# Patient Record
Sex: FEMALE | Employment: FULL TIME | URBAN - METROPOLITAN AREA
[De-identification: names, ages, dates, MRNs, and addresses within clinical notes are randomized per-mention and may not be internally consistent; named-entity substitution may affect disease eponyms.]

---

## 2024-04-10 ENCOUNTER — MEDICAL CORRESPONDENCE (OUTPATIENT)
Dept: HEALTH INFORMATION MANAGEMENT | Facility: CLINIC | Age: 34
End: 2024-04-10
Payer: COMMERCIAL

## 2024-04-16 RX ORDER — DIAZEPAM 10 MG
10 TABLET ORAL EVERY 6 HOURS PRN
Status: ON HOLD | COMMUNITY
End: 2024-04-18

## 2024-04-16 RX ORDER — DESOGESTREL AND ETHINYL ESTRADIOL 0.15-0.03
1 KIT ORAL DAILY
COMMUNITY

## 2024-04-18 ENCOUNTER — HOSPITAL ENCOUNTER (OUTPATIENT)
Facility: CLINIC | Age: 34
Discharge: HOME | End: 2024-04-18
Attending: OBSTETRICS & GYNECOLOGY | Admitting: OBSTETRICS & GYNECOLOGY
Payer: COMMERCIAL

## 2024-04-18 ENCOUNTER — ANESTHESIA (OUTPATIENT)
Dept: SURGERY | Facility: CLINIC | Age: 34
End: 2024-04-18
Payer: COMMERCIAL

## 2024-04-18 ENCOUNTER — ANESTHESIA EVENT (OUTPATIENT)
Dept: SURGERY | Facility: CLINIC | Age: 34
End: 2024-04-18
Payer: COMMERCIAL

## 2024-04-18 VITALS
HEIGHT: 63 IN | HEART RATE: 71 BPM | OXYGEN SATURATION: 96 % | BODY MASS INDEX: 38.75 KG/M2 | WEIGHT: 218.7 LBS | RESPIRATION RATE: 16 BRPM | TEMPERATURE: 97.1 F | DIASTOLIC BLOOD PRESSURE: 74 MMHG | SYSTOLIC BLOOD PRESSURE: 128 MMHG

## 2024-04-18 DIAGNOSIS — R11.0 NAUSEA: ICD-10-CM

## 2024-04-18 DIAGNOSIS — G89.18 ACUTE POST-OPERATIVE PAIN: Primary | ICD-10-CM

## 2024-04-18 LAB
HCG INTACT+B SERPL-ACNC: <1 MIU/ML
HGB BLD-MCNC: 14.1 G/DL (ref 11.7–15.7)

## 2024-04-18 PROCEDURE — 58558 HYSTEROSCOPY BIOPSY: CPT | Performed by: ANESTHESIOLOGY

## 2024-04-18 PROCEDURE — 250N000009 HC RX 250: Performed by: ANESTHESIOLOGY

## 2024-04-18 PROCEDURE — 710N000009 HC RECOVERY PHASE 1, LEVEL 1, PER MIN: Performed by: OBSTETRICS & GYNECOLOGY

## 2024-04-18 PROCEDURE — 258N000001 HC RX 258: Performed by: OBSTETRICS & GYNECOLOGY

## 2024-04-18 PROCEDURE — 250N000009 HC RX 250: Performed by: OBSTETRICS & GYNECOLOGY

## 2024-04-18 PROCEDURE — 710N000012 HC RECOVERY PHASE 2, PER MINUTE: Performed by: OBSTETRICS & GYNECOLOGY

## 2024-04-18 PROCEDURE — 84702 CHORIONIC GONADOTROPIN TEST: CPT | Performed by: OBSTETRICS & GYNECOLOGY

## 2024-04-18 PROCEDURE — 360N000076 HC SURGERY LEVEL 3, PER MIN: Performed by: OBSTETRICS & GYNECOLOGY

## 2024-04-18 PROCEDURE — 85018 HEMOGLOBIN: CPT | Performed by: OBSTETRICS & GYNECOLOGY

## 2024-04-18 PROCEDURE — 272N000001 HC OR GENERAL SUPPLY STERILE: Performed by: OBSTETRICS & GYNECOLOGY

## 2024-04-18 PROCEDURE — 250N000013 HC RX MED GY IP 250 OP 250 PS 637: Performed by: OBSTETRICS & GYNECOLOGY

## 2024-04-18 PROCEDURE — 250N000011 HC RX IP 250 OP 636: Performed by: ANESTHESIOLOGY

## 2024-04-18 PROCEDURE — 370N000017 HC ANESTHESIA TECHNICAL FEE, PER MIN: Performed by: OBSTETRICS & GYNECOLOGY

## 2024-04-18 PROCEDURE — 88305 TISSUE EXAM BY PATHOLOGIST: CPT | Mod: 26 | Performed by: STUDENT IN AN ORGANIZED HEALTH CARE EDUCATION/TRAINING PROGRAM

## 2024-04-18 PROCEDURE — 58558 HYSTEROSCOPY BIOPSY: CPT

## 2024-04-18 PROCEDURE — 999N000141 HC STATISTIC PRE-PROCEDURE NURSING ASSESSMENT: Performed by: OBSTETRICS & GYNECOLOGY

## 2024-04-18 PROCEDURE — 88305 TISSUE EXAM BY PATHOLOGIST: CPT | Mod: TC | Performed by: OBSTETRICS & GYNECOLOGY

## 2024-04-18 PROCEDURE — 36415 COLL VENOUS BLD VENIPUNCTURE: CPT | Performed by: OBSTETRICS & GYNECOLOGY

## 2024-04-18 PROCEDURE — 258N000003 HC RX IP 258 OP 636: Performed by: ANESTHESIOLOGY

## 2024-04-18 RX ORDER — FENTANYL CITRATE 0.05 MG/ML
25 INJECTION, SOLUTION INTRAMUSCULAR; INTRAVENOUS EVERY 5 MIN PRN
Status: DISCONTINUED | OUTPATIENT
Start: 2024-04-18 | End: 2024-04-18 | Stop reason: HOSPADM

## 2024-04-18 RX ORDER — LIDOCAINE HYDROCHLORIDE AND EPINEPHRINE 10; 10 MG/ML; UG/ML
INJECTION, SOLUTION INFILTRATION; PERINEURAL PRN
Status: DISCONTINUED | OUTPATIENT
Start: 2024-04-18 | End: 2024-04-18 | Stop reason: HOSPADM

## 2024-04-18 RX ORDER — KETOROLAC TROMETHAMINE 30 MG/ML
INJECTION, SOLUTION INTRAMUSCULAR; INTRAVENOUS PRN
Status: DISCONTINUED | OUTPATIENT
Start: 2024-04-18 | End: 2024-04-18

## 2024-04-18 RX ORDER — IBUPROFEN 600 MG/1
600 TABLET, FILM COATED ORAL EVERY 6 HOURS PRN
Qty: 30 TABLET | Refills: 0 | Status: SHIPPED | OUTPATIENT
Start: 2024-04-18

## 2024-04-18 RX ORDER — PROPOFOL 10 MG/ML
INJECTION, EMULSION INTRAVENOUS PRN
Status: DISCONTINUED | OUTPATIENT
Start: 2024-04-18 | End: 2024-04-18

## 2024-04-18 RX ORDER — HYDROMORPHONE HCL IN WATER/PF 6 MG/30 ML
0.4 PATIENT CONTROLLED ANALGESIA SYRINGE INTRAVENOUS EVERY 5 MIN PRN
Status: DISCONTINUED | OUTPATIENT
Start: 2024-04-18 | End: 2024-04-18 | Stop reason: HOSPADM

## 2024-04-18 RX ORDER — ONDANSETRON 4 MG/1
4 TABLET, ORALLY DISINTEGRATING ORAL EVERY 30 MIN PRN
Status: DISCONTINUED | OUTPATIENT
Start: 2024-04-18 | End: 2024-04-18 | Stop reason: HOSPADM

## 2024-04-18 RX ORDER — HYDROMORPHONE HCL IN WATER/PF 6 MG/30 ML
0.2 PATIENT CONTROLLED ANALGESIA SYRINGE INTRAVENOUS EVERY 5 MIN PRN
Status: DISCONTINUED | OUTPATIENT
Start: 2024-04-18 | End: 2024-04-18 | Stop reason: HOSPADM

## 2024-04-18 RX ORDER — DEXAMETHASONE SODIUM PHOSPHATE 4 MG/ML
INJECTION, SOLUTION INTRA-ARTICULAR; INTRALESIONAL; INTRAMUSCULAR; INTRAVENOUS; SOFT TISSUE PRN
Status: DISCONTINUED | OUTPATIENT
Start: 2024-04-18 | End: 2024-04-18

## 2024-04-18 RX ORDER — ONDANSETRON 2 MG/ML
INJECTION INTRAMUSCULAR; INTRAVENOUS PRN
Status: DISCONTINUED | OUTPATIENT
Start: 2024-04-18 | End: 2024-04-18

## 2024-04-18 RX ORDER — LIDOCAINE HYDROCHLORIDE AND EPINEPHRINE 10; 10 MG/ML; UG/ML
INJECTION, SOLUTION INFILTRATION; PERINEURAL
Status: DISCONTINUED
Start: 2024-04-18 | End: 2024-04-18 | Stop reason: HOSPADM

## 2024-04-18 RX ORDER — SODIUM CHLORIDE, SODIUM LACTATE, POTASSIUM CHLORIDE, CALCIUM CHLORIDE 600; 310; 30; 20 MG/100ML; MG/100ML; MG/100ML; MG/100ML
INJECTION, SOLUTION INTRAVENOUS CONTINUOUS PRN
Status: DISCONTINUED | OUTPATIENT
Start: 2024-04-18 | End: 2024-04-18

## 2024-04-18 RX ORDER — NALOXONE HYDROCHLORIDE 0.4 MG/ML
0.1 INJECTION, SOLUTION INTRAMUSCULAR; INTRAVENOUS; SUBCUTANEOUS
Status: DISCONTINUED | OUTPATIENT
Start: 2024-04-18 | End: 2024-04-18 | Stop reason: HOSPADM

## 2024-04-18 RX ORDER — FENTANYL CITRATE 50 UG/ML
INJECTION, SOLUTION INTRAMUSCULAR; INTRAVENOUS PRN
Status: DISCONTINUED | OUTPATIENT
Start: 2024-04-18 | End: 2024-04-18

## 2024-04-18 RX ORDER — SODIUM CHLORIDE, SODIUM LACTATE, POTASSIUM CHLORIDE, CALCIUM CHLORIDE 600; 310; 30; 20 MG/100ML; MG/100ML; MG/100ML; MG/100ML
INJECTION, SOLUTION INTRAVENOUS CONTINUOUS
Status: DISCONTINUED | OUTPATIENT
Start: 2024-04-18 | End: 2024-04-18 | Stop reason: HOSPADM

## 2024-04-18 RX ORDER — ACETAMINOPHEN 325 MG/1
975 TABLET ORAL ONCE
Status: COMPLETED | OUTPATIENT
Start: 2024-04-18 | End: 2024-04-18

## 2024-04-18 RX ORDER — LIDOCAINE HYDROCHLORIDE 20 MG/ML
INJECTION, SOLUTION INFILTRATION; PERINEURAL PRN
Status: DISCONTINUED | OUTPATIENT
Start: 2024-04-18 | End: 2024-04-18

## 2024-04-18 RX ORDER — IBUPROFEN 600 MG/1
600 TABLET, FILM COATED ORAL
Status: DISCONTINUED | OUTPATIENT
Start: 2024-04-18 | End: 2024-04-18 | Stop reason: HOSPADM

## 2024-04-18 RX ORDER — PROPOFOL 10 MG/ML
INJECTION, EMULSION INTRAVENOUS CONTINUOUS PRN
Status: DISCONTINUED | OUTPATIENT
Start: 2024-04-18 | End: 2024-04-18

## 2024-04-18 RX ORDER — FENTANYL CITRATE 0.05 MG/ML
50 INJECTION, SOLUTION INTRAMUSCULAR; INTRAVENOUS EVERY 5 MIN PRN
Status: DISCONTINUED | OUTPATIENT
Start: 2024-04-18 | End: 2024-04-18 | Stop reason: HOSPADM

## 2024-04-18 RX ORDER — MAGNESIUM HYDROXIDE 1200 MG/15ML
LIQUID ORAL PRN
Status: DISCONTINUED | OUTPATIENT
Start: 2024-04-18 | End: 2024-04-18 | Stop reason: HOSPADM

## 2024-04-18 RX ORDER — ONDANSETRON 2 MG/ML
4 INJECTION INTRAMUSCULAR; INTRAVENOUS EVERY 30 MIN PRN
Status: DISCONTINUED | OUTPATIENT
Start: 2024-04-18 | End: 2024-04-18 | Stop reason: HOSPADM

## 2024-04-18 RX ORDER — ONDANSETRON 4 MG/1
4 TABLET, ORALLY DISINTEGRATING ORAL EVERY 8 HOURS PRN
Qty: 4 TABLET | Refills: 0 | Status: SHIPPED | OUTPATIENT
Start: 2024-04-18

## 2024-04-18 RX ORDER — HYDROCODONE BITARTRATE AND ACETAMINOPHEN 5; 325 MG/1; MG/1
1 TABLET ORAL
Status: DISCONTINUED | OUTPATIENT
Start: 2024-04-18 | End: 2024-04-18 | Stop reason: HOSPADM

## 2024-04-18 RX ADMIN — ACETAMINOPHEN 975 MG: 325 TABLET, FILM COATED ORAL at 09:22

## 2024-04-18 RX ADMIN — PROPOFOL 70 MG: 10 INJECTION, EMULSION INTRAVENOUS at 11:06

## 2024-04-18 RX ADMIN — PROPOFOL 200 MG: 10 INJECTION, EMULSION INTRAVENOUS at 11:04

## 2024-04-18 RX ADMIN — KETOROLAC TROMETHAMINE 30 MG: 30 INJECTION, SOLUTION INTRAMUSCULAR at 11:22

## 2024-04-18 RX ADMIN — DEXMEDETOMIDINE HYDROCHLORIDE 8 MCG: 100 INJECTION, SOLUTION INTRAVENOUS at 10:59

## 2024-04-18 RX ADMIN — SODIUM CHLORIDE, POTASSIUM CHLORIDE, SODIUM LACTATE AND CALCIUM CHLORIDE: 600; 310; 30; 20 INJECTION, SOLUTION INTRAVENOUS at 10:59

## 2024-04-18 RX ADMIN — DEXAMETHASONE SODIUM PHOSPHATE 4 MG: 4 INJECTION, SOLUTION INTRA-ARTICULAR; INTRALESIONAL; INTRAMUSCULAR; INTRAVENOUS; SOFT TISSUE at 11:10

## 2024-04-18 RX ADMIN — FENTANYL CITRATE 50 MCG: 50 INJECTION INTRAMUSCULAR; INTRAVENOUS at 11:18

## 2024-04-18 RX ADMIN — ONDANSETRON 4 MG: 2 INJECTION INTRAMUSCULAR; INTRAVENOUS at 11:10

## 2024-04-18 RX ADMIN — PROPOFOL 200 MCG/KG/MIN: 10 INJECTION, EMULSION INTRAVENOUS at 11:07

## 2024-04-18 RX ADMIN — LIDOCAINE HYDROCHLORIDE 100 MG: 20 INJECTION, SOLUTION INFILTRATION; PERINEURAL at 11:04

## 2024-04-18 RX ADMIN — FENTANYL CITRATE 50 MCG: 50 INJECTION INTRAMUSCULAR; INTRAVENOUS at 11:04

## 2024-04-18 RX ADMIN — MIDAZOLAM 2 MG: 1 INJECTION INTRAMUSCULAR; INTRAVENOUS at 10:59

## 2024-04-18 ASSESSMENT — ACTIVITIES OF DAILY LIVING (ADL)
ADLS_ACUITY_SCORE: 35
ADLS_ACUITY_SCORE: 33

## 2024-04-18 ASSESSMENT — ENCOUNTER SYMPTOMS: SEIZURES: 0

## 2024-04-18 ASSESSMENT — LIFESTYLE VARIABLES: TOBACCO_USE: 0

## 2024-04-18 NOTE — ANESTHESIA POSTPROCEDURE EVALUATION
Patient: Sarah Parada    Procedure: Procedure(s):  HYSTEROSCOPY DILATION AND CURETTAGE WITH MYOSURE       Anesthesia Type:  General    Note:  Disposition: Outpatient   Postop Pain Control: Uneventful            Sign Out: Well controlled pain   PONV: No   Neuro/Psych: Uneventful            Sign Out: Acceptable/Baseline neuro status   Airway/Respiratory: Uneventful            Sign Out: Acceptable/Baseline resp. status   CV/Hemodynamics: Uneventful            Sign Out: Acceptable CV status   Other NRE: NONE   DID A NON-ROUTINE EVENT OCCUR? No           Last vitals:  Vitals Value Taken Time   /75 04/18/24 1230   Temp     Pulse 79 04/18/24 1232   Resp 14 04/18/24 1232   SpO2 98 % 04/18/24 1234   Vitals shown include unfiled device data.    Electronically Signed By: eLle Wilcox MD  April 18, 2024  6:57 PM

## 2024-04-18 NOTE — ANESTHESIA PROCEDURE NOTES
Airway       Patient location during procedure: OR  Staff -        Anesthesiologist:  Lele Wilcox MD       CRNA: Erica Young APRN CRNA       Other Anesthesia Staff: Kathy Rajan       Performed By: ASHLEY and with CRNAs       Procedure performed by resident/fellow/CRNA in presence of a teaching physician.    Consent for Airway        Urgency: elective  Indications and Patient Condition       Indications for airway management: nickie-procedural       Induction type:intravenous       Mask difficulty assessment: 0 - not attempted    Final Airway Details       Final airway type: supraglottic airway    Supraglottic Airway Details        Type: LMA       Brand: Ambu AuraGain       LMA size: 4    Post intubation assessment        Placement verified by: capnometry, equal breath sounds and chest rise        Number of attempts at approach: 1       Secured with: tape       Ease of procedure: easy       Dentition: Intact and Unchanged

## 2024-04-18 NOTE — ANESTHESIA PREPROCEDURE EVALUATION
"Anesthesia Pre-Procedure Evaluation    Patient: Sarah Parada   MRN: [id number] : 1990        Procedure : Procedure(s):  HYSTEROSCOPY DILATION AND CURETTAGE WITH MYOSURE          Past Medical History:   Diagnosis Date    Uterine polyp       Past Surgical History:   Procedure Laterality Date    APPENDECTOMY  2020    LAPAROSCOPY  2021    LAPAROSCOPY  2020      No Known Allergies   Social History     Tobacco Use    Smoking status: Never    Smokeless tobacco: Not on file   Substance Use Topics    Alcohol use: Yes     Comment: occ      Wt Readings from Last 1 Encounters:   No data found for Wt        Anesthesia Evaluation            ROS/MED HX  ENT/Pulmonary:    (-) tobacco use, asthma and sleep apnea   Neurologic:    (-) no seizures and no CVA   Cardiovascular:    (-) hypertension   METS/Exercise Tolerance: >4 METS    Hematologic:       Musculoskeletal:       GI/Hepatic:    (-) GERD and liver disease   Renal/Genitourinary:    (-) renal disease   Endo:     (+)               Obesity,    (-) Type II DM and thyroid disease   Psychiatric/Substance Use:     (+) psychiatric history anxiety       Infectious Disease:       Malignancy:       Other:            Physical Exam    Airway        Mallampati: II   TM distance: > 3 FB   Neck ROM: full   Mouth opening: > 3 cm    Respiratory Devices and Support         Dental  no notable dental history     (+) Completely normal teeth      Cardiovascular          Rhythm and rate: regular and normal     Pulmonary   pulmonary exam normal                OUTSIDE LABS:  CBC: No results found for: \"WBC\", \"HGB\", \"HCT\", \"PLT\"  BMP: No results found for: \"NA\", \"POTASSIUM\", \"CHLORIDE\", \"CO2\", \"BUN\", \"CR\", \"GLC\"  COAGS: No results found for: \"PTT\", \"INR\", \"FIBR\"  POC: No results found for: \"BGM\", \"HCG\", \"HCGS\"  HEPATIC: No results found for: \"ALBUMIN\", \"PROTTOTAL\", \"ALT\", \"AST\", \"GGT\", \"ALKPHOS\", \"BILITOTAL\", \"BILIDIRECT\", \"LOIS\"  OTHER: No results found for: \"PH\", \"LACT\", " "\"A1C\", \"VISHNU\", \"PHOS\", \"MAG\", \"LIPASE\", \"AMYLASE\", \"TSH\", \"T4\", \"T3\", \"CRP\", \"SED\"    Anesthesia Plan    ASA Status:  2    NPO Status:  NPO Appropriate    Anesthesia Type: General.     - Airway: LMA   Induction: Intravenous, Propofol.   Maintenance: Balanced.        Consents    Anesthesia Plan(s) and associated risks, benefits, and realistic alternatives discussed. Questions answered and patient/representative(s) expressed understanding.     - Discussed:     - Discussed with:  Patient            Postoperative Care    Pain management: IV analgesics.   PONV prophylaxis: Ondansetron (or other 5HT-3), Dexamethasone or Solumedrol, Background Propofol Infusion     Comments:               Lele Wilcox MD    I have reviewed the pertinent notes and labs in the chart from the past 30 days and (re)examined the patient.  Any updates or changes from those notes are reflected in this note.                  "

## 2024-04-18 NOTE — DISCHARGE INSTRUCTIONS
Today you were given 975 mg of Tylenol at 09:22am. The recommended daily maximum dose is 4000 mg.     Today you received Toradol, an antiinflammatory medication similar to Ibuprofen.  You should not take other antiinflammatory medication, such as Ibuprofen, Motrin, Advil, Aleve, Naprosyn, etc until 5:20 pm.    Same Day Surgery Discharge Instructions for  Sedation and General Anesthesia     It's not unusual to feel dizzy, light-headed or faint for up to 24 hours after surgery or while taking pain medication.  If you have these symptoms: sit for a few minutes before standing and have someone assist you when you get up to walk or use the bathroom.    You should rest and relax for the next 24 hours. We recommend you make arrangements to have an adult stay with you for at least 24 hours after your discharge.  Avoid hazardous and strenuous activity.    DO NOT DRIVE any vehicle or operate mechanical equipment for 24 hours following the end of your surgery.  Even though you may feel normal, your reactions may be affected by the medication you have received.    Do not drink alcoholic beverages for 24 hours following surgery.     Slowly progress to your regular diet as you feel able. It's not unusual to feel nauseated and/or vomit after receiving anesthesia.  If you develop these symptoms, drink clear liquids (apple juice, ginger ale, broth, 7-up, etc. ) until you feel better.  If your nausea and vomiting persists for 24 hours, please notify your surgeon.      All narcotic pain medications, along with inactivity and anesthesia, can cause constipation. Drinking plenty of liquids and increasing fiber intake will help.    For any questions of a medical nature, call your surgeon.    Do not make important decisions for 24 hours.    If you had general anesthesia, you may have a sore throat for a couple of days related to the breathing tube used during surgery.  You may use Cepacol lozenges to help with this discomfort.  If it  worsens or if you develop a fever, contact your surgeon.     If you feel your pain is not well managed with the pain medications prescribed by your surgeon, please contact your surgeon's office to let them know so they can address your concerns.      **If you have questions or concerns about your procedure,   call Dr. Dumont at **

## 2024-04-18 NOTE — ANESTHESIA CARE TRANSFER NOTE
Patient: Sarah Parada    Procedure: Procedure(s):  HYSTEROSCOPY DILATION AND CURETTAGE WITH MYOSURE       Diagnosis: Endometrial polyp [N84.0]  Diagnosis Additional Information: No value filed.    Anesthesia Type:   General     Note:    Oropharynx: oropharynx clear of all foreign objects and spontaneously breathing  Level of Consciousness: awake and drowsy  Oxygen Supplementation: face mask  Level of Supplemental Oxygen (L/min / FiO2): 6  Independent Airway: airway patency satisfactory and stable  Dentition: dentition unchanged  Vital Signs Stable: post-procedure vital signs reviewed and stable  Report to RN Given: handoff report given  Patient transferred to: PACU    Handoff Report: Identifed the Patient, Identified the Reponsible Provider, Reviewed the pertinent medical history, Discussed the surgical course, Reviewed Intra-OP anesthesia mangement and issues during anesthesia, Set expectations for post-procedure period and Allowed opportunity for questions and acknowledgement of understanding      Vitals:  Vitals Value Taken Time   BP     Temp     Pulse 108 04/18/24 1133   Resp 16 04/18/24 1133   SpO2 99 % 04/18/24 1133   Vitals shown include unfiled device data.    Electronically Signed By: MARCELL Fish CRNA  April 18, 2024  11:34 AM

## 2024-04-18 NOTE — OP NOTE
Sarah Pattenthomas  1990  4/18/2024      Preop Dx: 1. Endometrial polyp    Postop Dx:  Same  2. Secretory appearing endometrium                          Procedure:  Dilatation, Curettage, Hysteroscopy, Myosure    Anesthesia:  MAC, LMA, paracervical block    Surgeon:  Nieves Son MD    Procedure:  The patient was brought to the operating room where following general anesthesia was placed in the dorsolithotomy position where she was prepped and draped.      A speculum was placed.  Paracervical block placed with 1% lidocaine with epinephrine. The cervix was grasped with a tenaculum.  The cervix was dilated to accommodate the hysteroscope.  Under direct visualization the hysteroscope was placed into the endometrial cavity with the findings of endometrial polyp.  Myosure introduced and polyp removed.  There was additional tissue that may have been thickened lining vs polyp that was also removed with the myosure.      The specimens were submitted to pathology for microscopic analysis.    The instruments were removed from the vagina and all areas were hemostatic.  The estimated blood loss was 5 cc.  All sponge, needle, and instrument counts were correct.  The patient was awakened and removed to the recovery room in stable condition.    Nieves Son MD  April 18, 2024

## 2024-04-19 LAB
PATH REPORT.COMMENTS IMP SPEC: NORMAL
PATH REPORT.COMMENTS IMP SPEC: NORMAL
PATH REPORT.FINAL DX SPEC: NORMAL
PATH REPORT.GROSS SPEC: NORMAL
PATH REPORT.MICROSCOPIC SPEC OTHER STN: NORMAL
PATH REPORT.RELEVANT HX SPEC: NORMAL
PHOTO IMAGE: NORMAL

## 2024-05-19 ENCOUNTER — HEALTH MAINTENANCE LETTER (OUTPATIENT)
Age: 34
End: 2024-05-19

## 2024-09-26 LAB
ABO (EXTERNAL): NORMAL
HEPATITIS B SURFACE ANTIGEN (EXTERNAL): NONREACTIVE
HIV1+2 AB SERPL QL IA: NONREACTIVE
PLATELET COUNT (EXTERNAL): 319 10E3/UL (ref 140–400)
RH (EXTERNAL): POSITIVE
RUBELLA ANTIBODY IGG (EXTERNAL): NORMAL
TREPONEMA PALLIDUM ANTIBODY (EXTERNAL): NONREACTIVE

## 2024-10-10 ENCOUNTER — TRANSFERRED RECORDS (OUTPATIENT)
Dept: MULTI SPECIALTY CLINIC | Facility: CLINIC | Age: 34
End: 2024-10-10

## 2024-10-10 LAB — PAP SMEAR - HIM PATIENT REPORTED: NORMAL

## 2024-10-23 ENCOUNTER — MEDICAL CORRESPONDENCE (OUTPATIENT)
Dept: HEALTH INFORMATION MANAGEMENT | Facility: CLINIC | Age: 34
End: 2024-10-23
Payer: COMMERCIAL

## 2024-10-24 ENCOUNTER — TRANSCRIBE ORDERS (OUTPATIENT)
Dept: MATERNAL FETAL MEDICINE | Facility: CLINIC | Age: 34
End: 2024-10-24
Payer: COMMERCIAL

## 2024-10-24 DIAGNOSIS — O26.90 PREGNANCY RELATED CONDITION, ANTEPARTUM: Primary | ICD-10-CM

## 2024-11-26 ENCOUNTER — PRE VISIT (OUTPATIENT)
Dept: MATERNAL FETAL MEDICINE | Facility: CLINIC | Age: 34
End: 2024-11-26
Payer: COMMERCIAL

## 2024-11-29 ENCOUNTER — HOSPITAL ENCOUNTER (OUTPATIENT)
Dept: ULTRASOUND IMAGING | Facility: CLINIC | Age: 34
Discharge: HOME | End: 2024-11-29
Attending: OBSTETRICS & GYNECOLOGY
Payer: COMMERCIAL

## 2024-11-29 DIAGNOSIS — O26.90 PREGNANCY RELATED CONDITION, ANTEPARTUM: ICD-10-CM

## 2024-11-29 PROCEDURE — 76811 OB US DETAILED SNGL FETUS: CPT

## 2024-11-29 PROCEDURE — 76811 OB US DETAILED SNGL FETUS: CPT | Mod: 26 | Performed by: OBSTETRICS & GYNECOLOGY

## 2024-12-03 ENCOUNTER — HOSPITAL ENCOUNTER (EMERGENCY)
Facility: CLINIC | Age: 34
Discharge: HOME | End: 2024-12-03
Attending: EMERGENCY MEDICINE
Payer: COMMERCIAL

## 2024-12-03 ENCOUNTER — APPOINTMENT (OUTPATIENT)
Dept: ULTRASOUND IMAGING | Facility: CLINIC | Age: 34
End: 2024-12-03
Attending: EMERGENCY MEDICINE
Payer: COMMERCIAL

## 2024-12-03 VITALS
RESPIRATION RATE: 24 BRPM | SYSTOLIC BLOOD PRESSURE: 142 MMHG | OXYGEN SATURATION: 100 % | DIASTOLIC BLOOD PRESSURE: 74 MMHG | HEART RATE: 92 BPM | TEMPERATURE: 98.2 F

## 2024-12-03 DIAGNOSIS — O16.2 HYPERTENSION DURING PREGNANCY IN SECOND TRIMESTER, UNSPECIFIED HYPERTENSION IN PREGNANCY TYPE: ICD-10-CM

## 2024-12-03 LAB
ALBUMIN MFR UR ELPH: <6 MG/DL
ALBUMIN SERPL BCG-MCNC: 3.6 G/DL (ref 3.5–5.2)
ALBUMIN UR-MCNC: NEGATIVE MG/DL
ALP SERPL-CCNC: 68 U/L (ref 40–150)
ALT SERPL W P-5'-P-CCNC: 34 U/L (ref 0–50)
ANION GAP SERPL CALCULATED.3IONS-SCNC: 10 MMOL/L (ref 7–15)
APPEARANCE UR: CLEAR
AST SERPL W P-5'-P-CCNC: 29 U/L (ref 0–45)
ATRIAL RATE - MUSE: 93 BPM
BASOPHILS # BLD AUTO: 0 10E3/UL (ref 0–0.2)
BASOPHILS NFR BLD AUTO: 0 %
BILIRUB SERPL-MCNC: 0.2 MG/DL
BILIRUB UR QL STRIP: NEGATIVE
BUN SERPL-MCNC: 8.4 MG/DL (ref 6–20)
CALCIUM SERPL-MCNC: 9.7 MG/DL (ref 8.8–10.4)
CHLORIDE SERPL-SCNC: 106 MMOL/L (ref 98–107)
COLOR UR AUTO: COLORLESS
CREAT SERPL-MCNC: 0.58 MG/DL (ref 0.51–0.95)
CREAT UR-MCNC: 33.5 MG/DL
DIASTOLIC BLOOD PRESSURE - MUSE: 84 MMHG
EGFRCR SERPLBLD CKD-EPI 2021: >90 ML/MIN/1.73M2
EOSINOPHIL # BLD AUTO: 0.2 10E3/UL (ref 0–0.7)
EOSINOPHIL NFR BLD AUTO: 2 %
ERYTHROCYTE [DISTWIDTH] IN BLOOD BY AUTOMATED COUNT: 13.9 % (ref 10–15)
GLUCOSE SERPL-MCNC: 94 MG/DL (ref 70–99)
GLUCOSE UR STRIP-MCNC: NEGATIVE MG/DL
HCO3 SERPL-SCNC: 22 MMOL/L (ref 22–29)
HCT VFR BLD AUTO: 39.1 % (ref 35–47)
HGB BLD-MCNC: 13.2 G/DL (ref 11.7–15.7)
HGB UR QL STRIP: NEGATIVE
IMM GRANULOCYTES # BLD: 0.1 10E3/UL
IMM GRANULOCYTES NFR BLD: 1 %
INR PPP: 0.96 (ref 0.85–1.15)
INTERPRETATION ECG - MUSE: NORMAL
KETONES UR STRIP-MCNC: NEGATIVE MG/DL
LEUKOCYTE ESTERASE UR QL STRIP: NEGATIVE
LYMPHOCYTES # BLD AUTO: 2.6 10E3/UL (ref 0.8–5.3)
LYMPHOCYTES NFR BLD AUTO: 24 %
MAGNESIUM SERPL-MCNC: 1.7 MG/DL (ref 1.7–2.3)
MCH RBC QN AUTO: 29.5 PG (ref 26.5–33)
MCHC RBC AUTO-ENTMCNC: 33.8 G/DL (ref 31.5–36.5)
MCV RBC AUTO: 88 FL (ref 78–100)
MONOCYTES # BLD AUTO: 0.8 10E3/UL (ref 0–1.3)
MONOCYTES NFR BLD AUTO: 8 %
NEUTROPHILS # BLD AUTO: 7.3 10E3/UL (ref 1.6–8.3)
NEUTROPHILS NFR BLD AUTO: 66 %
NITRATE UR QL: NEGATIVE
NRBC # BLD AUTO: 0 10E3/UL
NRBC BLD AUTO-RTO: 0 /100
NT-PROBNP SERPL-MCNC: 76 PG/ML (ref 0–450)
P AXIS - MUSE: 67 DEGREES
PH UR STRIP: 6 [PH] (ref 5–7)
PLATELET # BLD AUTO: 236 10E3/UL (ref 150–450)
POTASSIUM SERPL-SCNC: 3.9 MMOL/L (ref 3.4–5.3)
PR INTERVAL - MUSE: 164 MS
PROT SERPL-MCNC: 6.4 G/DL (ref 6.4–8.3)
PROT/CREAT 24H UR: NORMAL MG/G{CREAT}
QRS DURATION - MUSE: 80 MS
QT - MUSE: 350 MS
QTC - MUSE: 435 MS
R AXIS - MUSE: 48 DEGREES
RBC # BLD AUTO: 4.47 10E6/UL (ref 3.8–5.2)
RBC URINE: 0 /HPF
SODIUM SERPL-SCNC: 138 MMOL/L (ref 135–145)
SP GR UR STRIP: 1.01 (ref 1–1.03)
SQUAMOUS EPITHELIAL: 5 /HPF
SYSTOLIC BLOOD PRESSURE - MUSE: 159 MMHG
T AXIS - MUSE: 21 DEGREES
TROPONIN T SERPL HS-MCNC: <6 NG/L
UROBILINOGEN UR STRIP-MCNC: <2 MG/DL
VENTRICULAR RATE- MUSE: 93 BPM
WBC # BLD AUTO: 11 10E3/UL (ref 4–11)
WBC URINE: 1 /HPF

## 2024-12-03 PROCEDURE — 84484 ASSAY OF TROPONIN QUANT: CPT | Performed by: EMERGENCY MEDICINE

## 2024-12-03 PROCEDURE — 83735 ASSAY OF MAGNESIUM: CPT | Performed by: EMERGENCY MEDICINE

## 2024-12-03 PROCEDURE — 36415 COLL VENOUS BLD VENIPUNCTURE: CPT | Performed by: EMERGENCY MEDICINE

## 2024-12-03 PROCEDURE — 83880 ASSAY OF NATRIURETIC PEPTIDE: CPT | Performed by: EMERGENCY MEDICINE

## 2024-12-03 PROCEDURE — 81001 URINALYSIS AUTO W/SCOPE: CPT | Performed by: EMERGENCY MEDICINE

## 2024-12-03 PROCEDURE — 99285 EMERGENCY DEPT VISIT HI MDM: CPT | Mod: 25 | Performed by: EMERGENCY MEDICINE

## 2024-12-03 PROCEDURE — 93005 ELECTROCARDIOGRAM TRACING: CPT | Performed by: EMERGENCY MEDICINE

## 2024-12-03 PROCEDURE — 85004 AUTOMATED DIFF WBC COUNT: CPT | Performed by: EMERGENCY MEDICINE

## 2024-12-03 PROCEDURE — 85610 PROTHROMBIN TIME: CPT | Performed by: EMERGENCY MEDICINE

## 2024-12-03 PROCEDURE — 99285 EMERGENCY DEPT VISIT HI MDM: CPT

## 2024-12-03 PROCEDURE — 82040 ASSAY OF SERUM ALBUMIN: CPT | Performed by: EMERGENCY MEDICINE

## 2024-12-03 PROCEDURE — 76815 OB US LIMITED FETUS(S): CPT

## 2024-12-03 PROCEDURE — 84156 ASSAY OF PROTEIN URINE: CPT | Performed by: EMERGENCY MEDICINE

## 2024-12-03 PROCEDURE — 250N000013 HC RX MED GY IP 250 OP 250 PS 637: Performed by: EMERGENCY MEDICINE

## 2024-12-03 PROCEDURE — 80053 COMPREHEN METABOLIC PANEL: CPT | Performed by: EMERGENCY MEDICINE

## 2024-12-03 PROCEDURE — 82247 BILIRUBIN TOTAL: CPT | Performed by: EMERGENCY MEDICINE

## 2024-12-03 RX ORDER — LABETALOL 100 MG/1
200 TABLET, FILM COATED ORAL ONCE
Status: COMPLETED | OUTPATIENT
Start: 2024-12-03 | End: 2024-12-03

## 2024-12-03 RX ADMIN — LABETALOL HYDROCHLORIDE 200 MG: 100 TABLET, FILM COATED ORAL at 19:28

## 2024-12-03 RX ADMIN — LABETALOL HYDROCHLORIDE 200 MG: 100 TABLET, FILM COATED ORAL at 18:13

## 2024-12-03 ASSESSMENT — COLUMBIA-SUICIDE SEVERITY RATING SCALE - C-SSRS
1. IN THE PAST MONTH, HAVE YOU WISHED YOU WERE DEAD OR WISHED YOU COULD GO TO SLEEP AND NOT WAKE UP?: NO
6. HAVE YOU EVER DONE ANYTHING, STARTED TO DO ANYTHING, OR PREPARED TO DO ANYTHING TO END YOUR LIFE?: NO
2. HAVE YOU ACTUALLY HAD ANY THOUGHTS OF KILLING YOURSELF IN THE PAST MONTH?: NO

## 2024-12-03 ASSESSMENT — ACTIVITIES OF DAILY LIVING (ADL)
ADLS_ACUITY_SCORE: 41

## 2024-12-03 NOTE — ED TRIAGE NOTES
Pt 19wk pregnant c/o swelling to hands and feet for past week. Worsening today. Pt also with elevated BP today. Took 200mg labetalol this morning at 6:15am. No flashing lights or visual disturbances. Mild HA today and yesterday. Endorses abdominal discomfort today. [facility] recommending going to ER. No vaginal discharge. Pt alert.     Triage Assessment (Adult)       Row Name 12/03/24 6079          Triage Assessment    Airway WDL WDL        Respiratory WDL    Respiratory WDL WDL        Skin Circulation/Temperature WDL    Skin Circulation/Temperature WDL WDL        Cardiac WDL    Cardiac WDL WDL        Peripheral/Neurovascular WDL    Peripheral Neurovascular WDL X  hand and feet swelling worsening today        Cognitive/Neuro/Behavioral WDL    Cognitive/Neuro/Behavioral WDL WDL

## 2024-12-03 NOTE — ED NOTES
Agree with triage note, pt reports dull headache, denies vision changes. Reports has had some chest pressure at times, but increase swelling to legs and feet for past week.

## 2024-12-04 NOTE — DISCHARGE INSTRUCTIONS
Follow-up with your OB appointment tomorrow as scheduled.  Take 400 mg of labetalol twice a day instead of your prior dose.    Return to the ER if you develop chest pain, shortness of breath, significantly worsening swelling, vaginal bleeding, severe headache, numbness, tingling, weakness or any new or worsening concerns  
inability to prepare and administer dose correctly/pt only swallows liquids pills were dispenced

## 2024-12-05 NOTE — ED PROVIDER NOTES
EMERGENCY DEPARTMENT ENCOUNTER      NAME: Sarah Parada  AGE: 34 year old female  YOB: 1990  MRN: [id number]  EVALUATION DATE & TIME: 12/3/2024  5:21 PM    PCP: No Ref-Primary, Physician    ED PROVIDER: Tameka John MD      Chief Complaint   Patient presents with    Hypertension    Edema         FINAL IMPRESSION:  1. Hypertension during pregnancy in second trimester, unspecified hypertension in pregnancy type          ED COURSE & MEDICAL DECISION MAKING:    Pertinent Labs & Imaging studies reviewed. (See chart for details)    5:33 PM I introduced myself to the patient, obtained patient history, performed a physical exam, and discussed plan for ED workup including potential diagnostic laboratory/imaging studies and interventions.    34 year old female presents to the Emergency Department for evaluation of ***    ED Course as of 12/05/24 1733 Tue Dec 03, 2024   1808 Denied any chest pain recently when I evaluated the patient   1918 Dr. Son recommended another 200 mg labetalol here as well as having the patient double up her dose at home so taking 400 mg twice daily.  She advised that she follow-up in clinic tomorrow and call at 8 AM to be seen by them tomorrow for recheck.       At the conclusion of the encounter I discussed the results of all of the tests and the disposition. The questions were answered. The patient or family acknowledged understanding and was agreeable with the care plan.       Medical Decision Making  Obtained supplemental history:Supplemental history obtained?: Documented in chart  Reviewed external records: External records reviewed?: Documented in chart  Care impacted by chronic illness:Documented in Chart  Care significantly affected by social determinants of health:N/A  Did you consider but not order tests?: Work up considered but not performed and documented in chart, if applicable  Did you interpret images independently?: Independent interpretation of ECG and  images noted in documentation, when applicable.  Consultation discussion with other provider:Did you involve another provider (consultant, , pharmacy, etc.)?: I discussed the care with another health care provider, see documentation for details.  Discharge. I prescribed additional prescription strength medication(s) as charted. See documentation for any additional details.    Not Applicable      MEDICATIONS GIVEN IN THE EMERGENCY:  Medications   labetalol (NORMODYNE) tablet 200 mg (200 mg Oral $Given 12/3/24 1813)   labetalol (NORMODYNE) tablet 200 mg (200 mg Oral $Given 12/3/24 1928)       NEW PRESCRIPTIONS STARTED AT TODAY'S ER VISIT  Discharge Medication List as of 12/3/2024  8:29 PM             =================================================================    HPI    Patient information was obtained from: Patient,     Sarah Parada is a 34 year old female who is 19w6d pregnant with chronic hypertension on labetalol, IVF who presents to this ED for evaluation of hypertension and swelling.       REVIEW OF SYSTEMS   Pertinent positives and negatives are documented in the HPI. All other systems reviewed and are negative.      PAST MEDICAL HISTORY:  Past Medical History:   Diagnosis Date    Uterine polyp        PAST SURGICAL HISTORY:  Past Surgical History:   Procedure Laterality Date    APPENDECTOMY  08/03/2020    DILATION AND CURETTAGE, OPERATIVE HYSTEROSCOPY WITH MORCELLATOR, COMBINED N/A 4/18/2024    Procedure: HYSTEROSCOPY DILATION AND CURETTAGE WITH MYOSURE;  Surgeon: Nieves Smith MD;  Location:  OR    LAPAROSCOPY  06/07/2021    LAPAROSCOPY  09/01/2020           CURRENT MEDICATIONS:    desogestrel-ethinyl estradiol (APRI) 0.15-30 MG-MCG tablet  ibuprofen (ADVIL/MOTRIN) 600 MG tablet  ondansetron (ZOFRAN ODT) 4 MG ODT tab        ALLERGIES:  No Known Allergies    FAMILY HISTORY:  No family history on file.    SOCIAL HISTORY:   Social History     Socioeconomic History    Marital  status:    Tobacco Use    Smoking status: Never   Substance and Sexual Activity    Alcohol use: Yes     Comment: occ    Drug use: Never       VITALS:  BP (!) 142/74   Pulse 92   Temp 98.2  F (36.8  C) (Temporal)   Resp 24   SpO2 100%     PHYSICAL EXAM    Physical Exam  Constitutional: Well developed, Well nourished, NAD, GCS 15  HENT: Normocephalic, Atraumatic, Bilateral external ears normal, Oropharynx normal, mucous membranes moist, Nose normal. Neck-  Normal range of motion, No tenderness, Supple, No stridor.    Eyes: PERRL, EOMI, Conjunctiva normal, No discharge.   Respiratory: Normal breath sounds, No respiratory distress, No wheezing or crackles, Speaks in full sentences easily.    Cardiovascular: Normal heart rate, Regular rhythm, No murmurs, No rubs, No gallops. 2+ radial pulses bilaterally  GI: Bowel sounds normal, Soft, No tenderness, No masses, No rebound or guarding.   Musculoskeletal: 2+ DP pulses. No notable lower extremity edema. *** No cyanosis, No clubbing. Good range of motion in all major joints. No tenderness to palpation or major deformities noted.  Integument: Warm, Dry, No erythema, No rash. No petechiae.   Neurologic: Alert & oriented x 3, 5/5 strength in all 4 extremities bilaterally. Sensation intact to light touch in all 4 extremities and the face bilaterally. No focal deficits noted. Normal gait. CN 2-12 intact. No visual field deficit.     Psychiatric: Affect normal, Judgment normal, Mood normal. Cooperative.      LAB:  All pertinent labs reviewed and interpreted.  Results for orders placed or performed during the hospital encounter of 12/03/24   US OB >14 Weeks Limited wo Fetal Measurement    Impression    IMPRESSION:  1.  Single living intrauterine gestation in breech position.   2.  No acute findings.     UA with Microscopic reflex to Culture    Specimen: Urine, Clean Catch   Result Value Ref Range    Color Urine Colorless Colorless, Straw, Light Yellow, Yellow     Appearance Urine Clear Clear    Glucose Urine Negative Negative mg/dL    Bilirubin Urine Negative Negative    Ketones Urine Negative Negative mg/dL    Specific Gravity Urine 1.007 1.001 - 1.030    Blood Urine Negative Negative    pH Urine 6.0 5.0 - 7.0    Protein Albumin Urine Negative Negative mg/dL    Urobilinogen Urine <2.0 <2.0 mg/dL    Nitrite Urine Negative Negative    Leukocyte Esterase Urine Negative Negative    RBC Urine 0 <=2 /HPF    WBC Urine 1 <=5 /HPF    Squamous Epithelials Urine 5 (H) <=1 /HPF   Comprehensive metabolic panel   Result Value Ref Range    Sodium 138 135 - 145 mmol/L    Potassium 3.9 3.4 - 5.3 mmol/L    Carbon Dioxide (CO2) 22 22 - 29 mmol/L    Anion Gap 10 7 - 15 mmol/L    Urea Nitrogen 8.4 6.0 - 20.0 mg/dL    Creatinine 0.58 0.51 - 0.95 mg/dL    GFR Estimate >90 >60 mL/min/1.73m2    Calcium 9.7 8.8 - 10.4 mg/dL    Chloride 106 98 - 107 mmol/L    Glucose 94 70 - 99 mg/dL    Alkaline Phosphatase 68 40 - 150 U/L    AST 29 0 - 45 U/L    ALT 34 0 - 50 U/L    Protein Total 6.4 6.4 - 8.3 g/dL    Albumin 3.6 3.5 - 5.2 g/dL    Bilirubin Total 0.2 <=1.2 mg/dL   Result Value Ref Range    Troponin T, High Sensitivity <6 <=14 ng/L   Nt probnp inpatient (BNP)   Result Value Ref Range    N terminal Pro BNP Inpatient 76 0 - 450 pg/mL   Result Value Ref Range    INR 0.96 0.85 - 1.15   Protein  random urine   Result Value Ref Range    Total Protein Urine mg/dL <6.0   mg/dL    Total Protein Urine mg/mg Creat      Creatinine Urine mg/dL 33.5 mg/dL   Result Value Ref Range    Magnesium 1.7 1.7 - 2.3 mg/dL   CBC with platelets and differential   Result Value Ref Range    WBC Count 11.0 4.0 - 11.0 10e3/uL    RBC Count 4.47 3.80 - 5.20 10e6/uL    Hemoglobin 13.2 11.7 - 15.7 g/dL    Hematocrit 39.1 35.0 - 47.0 %    MCV 88 78 - 100 fL    MCH 29.5 26.5 - 33.0 pg    MCHC 33.8 31.5 - 36.5 g/dL    RDW 13.9 10.0 - 15.0 %    Platelet Count 236 150 - 450 10e3/uL    % Neutrophils 66 %    % Lymphocytes 24 %    %  Monocytes 8 %    % Eosinophils 2 %    % Basophils 0 %    % Immature Granulocytes 1 %    NRBCs per 100 WBC 0 <1 /100    Absolute Neutrophils 7.3 1.6 - 8.3 10e3/uL    Absolute Lymphocytes 2.6 0.8 - 5.3 10e3/uL    Absolute Monocytes 0.8 0.0 - 1.3 10e3/uL    Absolute Eosinophils 0.2 0.0 - 0.7 10e3/uL    Absolute Basophils 0.0 0.0 - 0.2 10e3/uL    Absolute Immature Granulocytes 0.1 <=0.4 10e3/uL    Absolute NRBCs 0.0 10e3/uL   ECG 12-LEAD WITH MUSE (LHE)   Result Value Ref Range    Systolic Blood Pressure 159 mmHg    Diastolic Blood Pressure 84 mmHg    Ventricular Rate 93 BPM    Atrial Rate 93 BPM    HI Interval 164 ms    QRS Duration 80 ms     ms    QTc 435 ms    P Axis 67 degrees    R AXIS 48 degrees    T Axis 21 degrees    Interpretation ECG       Sinus rhythm  Normal ECG  No previous ECGs available  Confirmed by SEE ED PROVIDER NOTE FOR, ECG INTERPRETATION ([id number]),  GALI PRICE ([id number]) on 12/3/2024 5:41:35 PM         RADIOLOGY:  Reviewed all pertinent imaging. Please see official radiology report.  US OB >14 Weeks Limited wo Fetal Measurement   Final Result   IMPRESSION:   1.  Single living intrauterine gestation in breech position.    2.  No acute findings.             EKG:    Performed at: ***    Impression: ***        I have independently reviewed and interpreted the EKG(s) documented above.    Tameka John MD  [facility] EMERGENCY ROOM  [address] [id number] [phone]     Specimen: Urine, Clean Catch   Result Value Ref Range    Color Urine Colorless Colorless, Straw, Light Yellow, Yellow    Appearance Urine Clear Clear    Glucose Urine Negative Negative mg/dL    Bilirubin Urine Negative Negative    Ketones Urine Negative Negative mg/dL    Specific Gravity Urine 1.007 1.001 - 1.030    Blood Urine Negative Negative    pH Urine 6.0 5.0 - 7.0    Protein Albumin Urine Negative Negative mg/dL    Urobilinogen Urine <2.0 <2.0 mg/dL    Nitrite Urine Negative Negative    Leukocyte Esterase Urine Negative Negative    RBC Urine 0 <=2 /HPF    WBC Urine 1 <=5 /HPF    Squamous Epithelials Urine 5 (H) <=1 /HPF   Comprehensive metabolic panel   Result Value Ref Range    Sodium 138 135 - 145 mmol/L    Potassium 3.9 3.4 - 5.3 mmol/L    Carbon Dioxide (CO2) 22 22 - 29 mmol/L    Anion Gap 10 7 - 15 mmol/L    Urea Nitrogen 8.4 6.0 - 20.0 mg/dL    Creatinine 0.58 0.51 - 0.95 mg/dL    GFR Estimate >90 >60 mL/min/1.73m2    Calcium 9.7 8.8 - 10.4 mg/dL    Chloride 106 98 - 107 mmol/L    Glucose 94 70 - 99 mg/dL    Alkaline Phosphatase 68 40 - 150 U/L    AST 29 0 - 45 U/L    ALT 34 0 - 50 U/L    Protein Total 6.4 6.4 - 8.3 g/dL    Albumin 3.6 3.5 - 5.2 g/dL    Bilirubin Total 0.2 <=1.2 mg/dL   Result Value Ref Range    Troponin T, High Sensitivity <6 <=14 ng/L   Nt probnp inpatient (BNP)   Result Value Ref Range    N terminal Pro BNP Inpatient 76 0 - 450 pg/mL   Result Value Ref Range    INR 0.96 0.85 - 1.15   Protein  random urine   Result Value Ref Range    Total Protein Urine mg/dL <6.0   mg/dL    Total Protein Urine mg/mg Creat      Creatinine Urine mg/dL 33.5 mg/dL   Result Value Ref Range    Magnesium 1.7 1.7 - 2.3 mg/dL   CBC with platelets and differential   Result Value Ref Range    WBC Count 11.0 4.0 - 11.0 10e3/uL    RBC Count 4.47 3.80 - 5.20 10e6/uL    Hemoglobin 13.2 11.7 - 15.7 g/dL    Hematocrit 39.1 35.0 - 47.0 %    MCV 88 78 - 100 fL    MCH 29.5 26.5 - 33.0 pg    MCHC 33.8 31.5 - 36.5 g/dL     RDW 13.9 10.0 - 15.0 %    Platelet Count 236 150 - 450 10e3/uL    % Neutrophils 66 %    % Lymphocytes 24 %    % Monocytes 8 %    % Eosinophils 2 %    % Basophils 0 %    % Immature Granulocytes 1 %    NRBCs per 100 WBC 0 <1 /100    Absolute Neutrophils 7.3 1.6 - 8.3 10e3/uL    Absolute Lymphocytes 2.6 0.8 - 5.3 10e3/uL    Absolute Monocytes 0.8 0.0 - 1.3 10e3/uL    Absolute Eosinophils 0.2 0.0 - 0.7 10e3/uL    Absolute Basophils 0.0 0.0 - 0.2 10e3/uL    Absolute Immature Granulocytes 0.1 <=0.4 10e3/uL    Absolute NRBCs 0.0 10e3/uL   ECG 12-LEAD WITH MUSE (LHE)   Result Value Ref Range    Systolic Blood Pressure 159 mmHg    Diastolic Blood Pressure 84 mmHg    Ventricular Rate 93 BPM    Atrial Rate 93 BPM    CT Interval 164 ms    QRS Duration 80 ms     ms    QTc 435 ms    P Axis 67 degrees    R AXIS 48 degrees    T Axis 21 degrees    Interpretation ECG       Sinus rhythm  Normal ECG  No previous ECGs available  Confirmed by SEE ED PROVIDER NOTE FOR, ECG INTERPRETATION ([id number]),  GALI PRICE ([id number]) on 12/3/2024 5:41:35 PM         RADIOLOGY:  Reviewed all pertinent imaging. Please see official radiology report.  US OB >14 Weeks Limited wo Fetal Measurement   Final Result   IMPRESSION:   1.  Single living intrauterine gestation in breech position.    2.  No acute findings.             EKG:    Performed at: 5:41 pm    Impression: Normal sinus rhythm.  No evidence of acute ischemia.        I have independently reviewed and interpreted the EKG(s) documented above.    Tameka John MD  [facility] EMERGENCY ROOM  [address] [id number] [phone]       Tameka John MD  12/07/24 [id number]

## 2024-12-23 ENCOUNTER — OFFICE VISIT (OUTPATIENT)
Dept: MATERNAL FETAL MEDICINE | Facility: HOSPITAL | Age: 34
End: 2024-12-23
Attending: OBSTETRICS & GYNECOLOGY
Payer: COMMERCIAL

## 2024-12-23 ENCOUNTER — ANCILLARY PROCEDURE (OUTPATIENT)
Dept: ULTRASOUND IMAGING | Facility: HOSPITAL | Age: 34
End: 2024-12-23
Attending: OBSTETRICS & GYNECOLOGY
Payer: COMMERCIAL

## 2024-12-23 DIAGNOSIS — O09.812 PREGNANCY RESULTING FROM IN VITRO FERTILIZATION IN SECOND TRIMESTER: Primary | ICD-10-CM

## 2024-12-23 DIAGNOSIS — O26.90 PREGNANCY RELATED CONDITION, ANTEPARTUM: ICD-10-CM

## 2024-12-23 DIAGNOSIS — O10.919 CHRONIC HYPERTENSION DURING PREGNANCY: ICD-10-CM

## 2024-12-23 PROCEDURE — 93325 DOPPLER ECHO COLOR FLOW MAPG: CPT

## 2024-12-23 PROCEDURE — 93325 DOPPLER ECHO COLOR FLOW MAPG: CPT | Mod: 26 | Performed by: OBSTETRICS & GYNECOLOGY

## 2024-12-23 PROCEDURE — 99207 PR NO CHARGE LOS: CPT | Performed by: OBSTETRICS & GYNECOLOGY

## 2024-12-23 PROCEDURE — 76827 ECHO EXAM OF FETAL HEART: CPT

## 2024-12-23 PROCEDURE — 76827 ECHO EXAM OF FETAL HEART: CPT | Mod: 26 | Performed by: OBSTETRICS & GYNECOLOGY

## 2024-12-23 PROCEDURE — 76825 ECHO EXAM OF FETAL HEART: CPT | Mod: 26 | Performed by: OBSTETRICS & GYNECOLOGY

## 2024-12-23 NOTE — NURSING NOTE
Sarah RENEE Parada is a  at 22w5d who presents to [facility] for scheduled fetal echo. Pt reports positive fetal movement.  SBAR given to Dr. Gilbert, see note in Epic.

## 2024-12-23 NOTE — PROGRESS NOTES
Please refer to ultrasound report under 'Imaging' Studies of 'Chart Review' tabs.    Edmundo Gilbert M.D.

## 2025-01-15 ENCOUNTER — HOSPITAL ENCOUNTER (OUTPATIENT)
Facility: HOSPITAL | Age: 35
Discharge: HOME | End: 2025-01-15
Attending: REGISTERED NURSE | Admitting: REGISTERED NURSE
Payer: COMMERCIAL

## 2025-01-15 ENCOUNTER — HOSPITAL ENCOUNTER (OUTPATIENT)
Facility: HOSPITAL | Age: 35
End: 2025-01-15
Admitting: REGISTERED NURSE
Payer: COMMERCIAL

## 2025-01-15 VITALS
TEMPERATURE: 97.9 F | DIASTOLIC BLOOD PRESSURE: 65 MMHG | BODY MASS INDEX: 40.29 KG/M2 | RESPIRATION RATE: 18 BRPM | SYSTOLIC BLOOD PRESSURE: 137 MMHG | HEIGHT: 64 IN | WEIGHT: 236 LBS

## 2025-01-15 PROBLEM — Z36.89 ENCOUNTER FOR TRIAGE IN PREGNANT PATIENT: Status: ACTIVE | Noted: 2025-01-15

## 2025-01-15 LAB
ALBUMIN MFR UR ELPH: 20.3 MG/DL
ALBUMIN SERPL BCG-MCNC: 3.4 G/DL (ref 3.5–5.2)
ALP SERPL-CCNC: 87 U/L (ref 40–150)
ALT SERPL W P-5'-P-CCNC: 32 U/L (ref 0–50)
AST SERPL W P-5'-P-CCNC: 29 U/L (ref 0–45)
BILIRUB DIRECT SERPL-MCNC: <0.2 MG/DL (ref 0–0.3)
BILIRUB SERPL-MCNC: 0.2 MG/DL
CREAT SERPL-MCNC: 0.52 MG/DL (ref 0.51–0.95)
CREAT UR-MCNC: 138.3 MG/DL
EGFRCR SERPLBLD CKD-EPI 2021: >90 ML/MIN/1.73M2
ERYTHROCYTE [DISTWIDTH] IN BLOOD BY AUTOMATED COUNT: 14.3 % (ref 10–15)
HCT VFR BLD AUTO: 39 % (ref 35–47)
HGB BLD-MCNC: 13.3 G/DL (ref 11.7–15.7)
HOLD SPECIMEN: NORMAL
HOLD SPECIMEN: NORMAL
MCH RBC QN AUTO: 30.6 PG (ref 26.5–33)
MCHC RBC AUTO-ENTMCNC: 34.1 G/DL (ref 31.5–36.5)
MCV RBC AUTO: 90 FL (ref 78–100)
PLATELET # BLD AUTO: 214 10E3/UL (ref 150–450)
PROT SERPL-MCNC: 5.8 G/DL (ref 6.4–8.3)
PROT/CREAT 24H UR: 0.15 MG/MG CR (ref 0–0.2)
RBC # BLD AUTO: 4.35 10E6/UL (ref 3.8–5.2)
WBC # BLD AUTO: 10 10E3/UL (ref 4–11)

## 2025-01-15 PROCEDURE — 82040 ASSAY OF SERUM ALBUMIN: CPT | Performed by: OBSTETRICS & GYNECOLOGY

## 2025-01-15 PROCEDURE — 85048 AUTOMATED LEUKOCYTE COUNT: CPT | Performed by: OBSTETRICS & GYNECOLOGY

## 2025-01-15 PROCEDURE — 84156 ASSAY OF PROTEIN URINE: CPT | Performed by: OBSTETRICS & GYNECOLOGY

## 2025-01-15 PROCEDURE — 59025 FETAL NON-STRESS TEST: CPT

## 2025-01-15 PROCEDURE — 85014 HEMATOCRIT: CPT | Performed by: OBSTETRICS & GYNECOLOGY

## 2025-01-15 PROCEDURE — 36415 COLL VENOUS BLD VENIPUNCTURE: CPT | Performed by: OBSTETRICS & GYNECOLOGY

## 2025-01-15 PROCEDURE — 82565 ASSAY OF CREATININE: CPT | Performed by: OBSTETRICS & GYNECOLOGY

## 2025-01-15 RX ORDER — VITAMIN A, VITAMIN C, VITAMIN D-3, VITAMIN E, VITAMIN B-1, VITAMIN B-2, NIACIN, VITAMIN B-6, CALCIUM, IRON, ZINC, COPPER 4000; 120; 400; 22; 1.84; 3; 20; 10; 1; 12; 200; 27; 25; 2 [IU]/1; MG/1; [IU]/1; MG/1; MG/1; MG/1; MG/1; MG/1; MG/1; UG/1; MG/1; MG/1; MG/1; MG/1
1 TABLET ORAL DAILY
COMMUNITY

## 2025-01-15 RX ORDER — ASPIRIN 81 MG/1
162 TABLET ORAL DAILY
COMMUNITY

## 2025-01-15 RX ORDER — LIDOCAINE 40 MG/G
CREAM TOPICAL
Status: DISCONTINUED | OUTPATIENT
Start: 2025-01-15 | End: 2025-01-15 | Stop reason: HOSPADM

## 2025-01-15 RX ORDER — LABETALOL 100 MG/1
400 TABLET, FILM COATED ORAL 3 TIMES DAILY
COMMUNITY

## 2025-01-15 ASSESSMENT — ACTIVITIES OF DAILY LIVING (ADL)
ADLS_ACUITY_SCORE: 41

## 2025-01-15 NOTE — PLAN OF CARE
Data: Patient assessed in the [facility] for elevated blood pressures. Cervical exam deferred. Membranes intact. Contractions are not present. See flowsheets for fetal assessment documentation.     Action: Presumed adequate fetal oxygenation documented. Discharge instructions reviewed. Patient instructed to report change in fetal movement, vaginal leaking of fluid or bleeding, abdominal pain, or any concerns related to the pregnancy to provider/clinic.      Response: Orders to discharge home per Ashley Max CNM. Patient verbalized understanding of education and agreement with plan. Discharged to home at 1120.

## 2025-01-15 NOTE — PROGRESS NOTES
Pt arrived from home with c/o elevated BP's that she has been monitoring at home. Sarah denies headache, visual disturbances, or epigastric pain. There is noted generalized and dependent edema, pt c/o mild shortness of breath, lung sounds are clear. Reflexes are +3 in LE's, no clonus. Dr Andersen was present on the unit when pt 1st arrived and did order labs. Ashley Max CNM has been notified of serial BP's and lab results. Will continue to monitor baby due to minimal variability and no traced accels.

## 2025-01-15 NOTE — PROGRESS NOTES
Ashley Max CNM called after viewing monitor strip remotely. Pt to discharge to home and follow up within the week.

## 2025-01-20 ENCOUNTER — LAB (OUTPATIENT)
Dept: LAB | Facility: CLINIC | Age: 35
End: 2025-01-20
Payer: COMMERCIAL

## 2025-01-20 DIAGNOSIS — O10.919 CHRONIC HYPERTENSION IN OBSTETRIC CONTEXT: ICD-10-CM

## 2025-01-20 DIAGNOSIS — Z34.01 ENCOUNTER FOR SUPERVISION OF NORMAL FIRST PREGNANCY IN FIRST TRIMESTER: Primary | ICD-10-CM

## 2025-01-20 LAB
ALT SERPL W P-5'-P-CCNC: 27 U/L (ref 0–50)
AST SERPL W P-5'-P-CCNC: 26 U/L (ref 0–45)
BASOPHILS # BLD AUTO: 0 10E3/UL (ref 0–0.2)
BASOPHILS NFR BLD AUTO: 0 %
BUN SERPL-MCNC: 7.1 MG/DL (ref 6–20)
EOSINOPHIL # BLD AUTO: 0.2 10E3/UL (ref 0–0.7)
EOSINOPHIL NFR BLD AUTO: 2 %
ERYTHROCYTE [DISTWIDTH] IN BLOOD BY AUTOMATED COUNT: 14.5 % (ref 10–15)
HCT VFR BLD AUTO: 39.9 % (ref 35–47)
HGB BLD-MCNC: 13.8 G/DL (ref 11.7–15.7)
IMM GRANULOCYTES # BLD: 0.1 10E3/UL
IMM GRANULOCYTES NFR BLD: 1 %
LYMPHOCYTES # BLD AUTO: 2 10E3/UL (ref 0.8–5.3)
LYMPHOCYTES NFR BLD AUTO: 20 %
MCH RBC QN AUTO: 30.9 PG (ref 26.5–33)
MCHC RBC AUTO-ENTMCNC: 34.6 G/DL (ref 31.5–36.5)
MCV RBC AUTO: 89 FL (ref 78–100)
MONOCYTES # BLD AUTO: 0.6 10E3/UL (ref 0–1.3)
MONOCYTES NFR BLD AUTO: 6 %
NEUTROPHILS # BLD AUTO: 7.2 10E3/UL (ref 1.6–8.3)
NEUTROPHILS NFR BLD AUTO: 72 %
NRBC # BLD AUTO: 0 10E3/UL
NRBC BLD AUTO-RTO: 0 /100
PLATELET # BLD AUTO: 219 10E3/UL (ref 150–450)
RBC # BLD AUTO: 4.47 10E6/UL (ref 3.8–5.2)
URATE SERPL-MCNC: 5.2 MG/DL (ref 2.4–5.7)
WBC # BLD AUTO: 10.1 10E3/UL (ref 4–11)

## 2025-01-20 PROCEDURE — 85025 COMPLETE CBC W/AUTO DIFF WBC: CPT

## 2025-01-20 PROCEDURE — 84550 ASSAY OF BLOOD/URIC ACID: CPT

## 2025-01-20 PROCEDURE — 36415 COLL VENOUS BLD VENIPUNCTURE: CPT

## 2025-01-20 PROCEDURE — 84520 ASSAY OF UREA NITROGEN: CPT

## 2025-01-20 PROCEDURE — 84450 TRANSFERASE (AST) (SGOT): CPT

## 2025-01-20 PROCEDURE — 84460 ALANINE AMINO (ALT) (SGPT): CPT

## 2025-01-30 ENCOUNTER — TRANSCRIBE ORDERS (OUTPATIENT)
Dept: MATERNAL FETAL MEDICINE | Facility: CLINIC | Age: 35
End: 2025-01-30
Payer: COMMERCIAL

## 2025-01-30 DIAGNOSIS — O26.90 PREGNANCY RELATED CONDITION, ANTEPARTUM: Primary | ICD-10-CM

## 2025-02-05 ENCOUNTER — OFFICE VISIT (OUTPATIENT)
Dept: MATERNAL FETAL MEDICINE | Facility: CLINIC | Age: 35
End: 2025-02-05
Attending: OBSTETRICS & GYNECOLOGY
Payer: COMMERCIAL

## 2025-02-05 ENCOUNTER — HOSPITAL ENCOUNTER (OUTPATIENT)
Dept: ULTRASOUND IMAGING | Facility: CLINIC | Age: 35
Discharge: HOME | End: 2025-02-05
Attending: OBSTETRICS & GYNECOLOGY
Payer: COMMERCIAL

## 2025-02-05 DIAGNOSIS — O10.919 CHRONIC HYPERTENSION AFFECTING PREGNANCY: Primary | ICD-10-CM

## 2025-02-05 DIAGNOSIS — O26.90 PREGNANCY RELATED CONDITION, ANTEPARTUM: ICD-10-CM

## 2025-02-05 PROCEDURE — 76816 OB US FOLLOW-UP PER FETUS: CPT

## 2025-02-05 NOTE — PROGRESS NOTES
The patient was seen for an ultrasound in [facility] today.  For a detailed report of the ultrasound examination, please see the ultrasound report which can be found under the imaging tab.    If you have questions regarding today's evaluation or if we can be of further service, please contact the [facility].    Melonie Xavier M.D.  M[facility]ist

## 2025-02-05 NOTE — NURSING NOTE
Patient reports good fetal movement,  denies contractions, leaking of fluid, or bleeding.  Patient denies headache, visual changes, epigastric pain related to preeclampsia.   SBAR given to TAMI LAURENT, see their note in Epic.

## 2025-02-12 ENCOUNTER — HOSPITAL ENCOUNTER (INPATIENT)
Facility: HOSPITAL | Age: 35
End: 2025-02-12
Admitting: OBSTETRICS & GYNECOLOGY
Payer: COMMERCIAL

## 2025-02-12 DIAGNOSIS — Z80.3 FAMILY HX-BREAST MALIGNANCY: ICD-10-CM

## 2025-02-12 DIAGNOSIS — R23.4 BREAST SKIN CHANGES: ICD-10-CM

## 2025-02-12 DIAGNOSIS — O11.9 PRE-ECLAMPSIA SUPERIMPOSED ON CHRONIC HYPERTENSION, ANTEPARTUM: ICD-10-CM

## 2025-02-12 PROBLEM — Z34.90 INTRAUTERINE PREGNANCY: Status: ACTIVE | Noted: 2025-02-12

## 2025-02-12 LAB
ABO + RH BLD: NORMAL
ALBUMIN MFR UR ELPH: 295.9 MG/DL
ALBUMIN SERPL BCG-MCNC: 3.3 G/DL (ref 3.5–5.2)
ALP SERPL-CCNC: 122 U/L (ref 40–150)
ALT SERPL W P-5'-P-CCNC: 28 U/L (ref 0–50)
ANION GAP SERPL CALCULATED.3IONS-SCNC: 11 MMOL/L (ref 7–15)
APTT PPP: 25 SECONDS (ref 22–38)
AST SERPL W P-5'-P-CCNC: 30 U/L (ref 0–45)
BILIRUB SERPL-MCNC: <0.2 MG/DL
BLD GP AB SCN SERPL QL: NEGATIVE
BUN SERPL-MCNC: 14.4 MG/DL (ref 6–20)
CALCIUM SERPL-MCNC: 10 MG/DL (ref 8.8–10.4)
CHLORIDE SERPL-SCNC: 105 MMOL/L (ref 98–107)
CREAT SERPL-MCNC: 0.61 MG/DL (ref 0.51–0.95)
CREAT UR-MCNC: 124.5 MG/DL
EGFRCR SERPLBLD CKD-EPI 2021: >90 ML/MIN/1.73M2
ERYTHROCYTE [DISTWIDTH] IN BLOOD BY AUTOMATED COUNT: 14.5 % (ref 10–15)
GLUCOSE SERPL-MCNC: 83 MG/DL (ref 70–99)
HCO3 SERPL-SCNC: 23 MMOL/L (ref 22–29)
HCT VFR BLD AUTO: 40.7 % (ref 35–47)
HGB BLD-MCNC: 13.5 G/DL (ref 11.7–15.7)
HOLD SPECIMEN: NORMAL
INR PPP: 0.9 (ref 0.85–1.15)
MCH RBC QN AUTO: 30.1 PG (ref 26.5–33)
MCHC RBC AUTO-ENTMCNC: 33.2 G/DL (ref 31.5–36.5)
MCV RBC AUTO: 91 FL (ref 78–100)
PLATELET # BLD AUTO: 205 10E3/UL (ref 150–450)
POTASSIUM SERPL-SCNC: 4.5 MMOL/L (ref 3.4–5.3)
PROT SERPL-MCNC: 5.9 G/DL (ref 6.4–8.3)
PROT/CREAT 24H UR: 2.38 MG/MG CR (ref 0–0.2)
RBC # BLD AUTO: 4.49 10E6/UL (ref 3.8–5.2)
SODIUM SERPL-SCNC: 139 MMOL/L (ref 135–145)
SPECIMEN EXP DATE BLD: NORMAL
WBC # BLD AUTO: 9.9 10E3/UL (ref 4–11)

## 2025-02-12 PROCEDURE — 80053 COMPREHEN METABOLIC PANEL: CPT

## 2025-02-12 PROCEDURE — 86900 BLOOD TYPING SEROLOGIC ABO: CPT

## 2025-02-12 PROCEDURE — 250N000013 HC RX MED GY IP 250 OP 250 PS 637

## 2025-02-12 PROCEDURE — 36415 COLL VENOUS BLD VENIPUNCTURE: CPT

## 2025-02-12 PROCEDURE — 250N000013 HC RX MED GY IP 250 OP 250 PS 637: Performed by: OBSTETRICS & GYNECOLOGY

## 2025-02-12 PROCEDURE — 85027 COMPLETE CBC AUTOMATED: CPT

## 2025-02-12 PROCEDURE — 86850 RBC ANTIBODY SCREEN: CPT

## 2025-02-12 PROCEDURE — 85730 THROMBOPLASTIN TIME PARTIAL: CPT

## 2025-02-12 PROCEDURE — 85610 PROTHROMBIN TIME: CPT

## 2025-02-12 PROCEDURE — 85041 AUTOMATED RBC COUNT: CPT

## 2025-02-12 PROCEDURE — 120N000001 HC R&B MED SURG/OB

## 2025-02-12 PROCEDURE — 84156 ASSAY OF PROTEIN URINE: CPT

## 2025-02-12 RX ORDER — LABETALOL HYDROCHLORIDE 5 MG/ML
20-40 INJECTION, SOLUTION INTRAVENOUS EVERY 10 MIN PRN
Status: DISCONTINUED | OUTPATIENT
Start: 2025-02-12 | End: 2025-02-14

## 2025-02-12 RX ORDER — ONDANSETRON 4 MG/1
4 TABLET, ORALLY DISINTEGRATING ORAL EVERY 6 HOURS PRN
Status: DISCONTINUED | OUTPATIENT
Start: 2025-02-12 | End: 2025-02-14 | Stop reason: HOSPADM

## 2025-02-12 RX ORDER — HYDRALAZINE HYDROCHLORIDE 20 MG/ML
5-10 INJECTION INTRAMUSCULAR; INTRAVENOUS
Status: DISCONTINUED | OUTPATIENT
Start: 2025-02-12 | End: 2025-02-14

## 2025-02-12 RX ORDER — METOCLOPRAMIDE HYDROCHLORIDE 5 MG/ML
10 INJECTION INTRAMUSCULAR; INTRAVENOUS EVERY 6 HOURS PRN
Status: DISCONTINUED | OUTPATIENT
Start: 2025-02-12 | End: 2025-02-14 | Stop reason: HOSPADM

## 2025-02-12 RX ORDER — LABETALOL 200 MG/1
400 TABLET, FILM COATED ORAL EVERY 8 HOURS SCHEDULED
Status: DISCONTINUED | OUTPATIENT
Start: 2025-02-12 | End: 2025-02-13

## 2025-02-12 RX ORDER — ACETAMINOPHEN 325 MG/1
650 TABLET ORAL EVERY 4 HOURS PRN
Status: DISCONTINUED | OUTPATIENT
Start: 2025-02-12 | End: 2025-02-14 | Stop reason: HOSPADM

## 2025-02-12 RX ORDER — SIMETHICONE 80 MG
160 TABLET,CHEWABLE ORAL EVERY 6 HOURS PRN
Status: DISCONTINUED | OUTPATIENT
Start: 2025-02-12 | End: 2025-02-14 | Stop reason: HOSPADM

## 2025-02-12 RX ORDER — LIDOCAINE 40 MG/G
CREAM TOPICAL
Status: DISCONTINUED | OUTPATIENT
Start: 2025-02-12 | End: 2025-02-12 | Stop reason: HOSPADM

## 2025-02-12 RX ORDER — DOCUSATE SODIUM 100 MG/1
100 CAPSULE, LIQUID FILLED ORAL 2 TIMES DAILY
Status: DISCONTINUED | OUTPATIENT
Start: 2025-02-12 | End: 2025-02-14 | Stop reason: HOSPADM

## 2025-02-12 RX ORDER — DIPHENHYDRAMINE HCL 25 MG
25 CAPSULE ORAL EVERY 6 HOURS PRN
Status: DISCONTINUED | OUTPATIENT
Start: 2025-02-12 | End: 2025-02-14 | Stop reason: HOSPADM

## 2025-02-12 RX ORDER — PROCHLORPERAZINE MALEATE 10 MG
10 TABLET ORAL EVERY 6 HOURS PRN
Status: DISCONTINUED | OUTPATIENT
Start: 2025-02-12 | End: 2025-02-14 | Stop reason: HOSPADM

## 2025-02-12 RX ORDER — PRENATAL VIT/IRON FUM/FOLIC AC 27MG-0.8MG
1 TABLET ORAL DAILY
Status: DISCONTINUED | OUTPATIENT
Start: 2025-02-13 | End: 2025-02-12

## 2025-02-12 RX ORDER — PRENATAL VIT/IRON FUM/FOLIC AC 27MG-0.8MG
1 TABLET ORAL AT BEDTIME
Status: DISCONTINUED | OUTPATIENT
Start: 2025-02-12 | End: 2025-02-14 | Stop reason: HOSPADM

## 2025-02-12 RX ORDER — LIDOCAINE 40 MG/G
CREAM TOPICAL
Status: DISCONTINUED | OUTPATIENT
Start: 2025-02-12 | End: 2025-02-17 | Stop reason: HOSPADM

## 2025-02-12 RX ORDER — MAGNESIUM HYDROXIDE/ALUMINUM HYDROXICE/SIMETHICONE 120; 1200; 1200 MG/30ML; MG/30ML; MG/30ML
30 SUSPENSION ORAL 2 TIMES DAILY PRN
Status: DISCONTINUED | OUTPATIENT
Start: 2025-02-12 | End: 2025-02-14 | Stop reason: HOSPADM

## 2025-02-12 RX ORDER — HYDROXYZINE HYDROCHLORIDE 50 MG/1
50 TABLET, FILM COATED ORAL
Status: DISCONTINUED | OUTPATIENT
Start: 2025-02-12 | End: 2025-02-14 | Stop reason: HOSPADM

## 2025-02-12 RX ORDER — METOCLOPRAMIDE 10 MG/1
10 TABLET ORAL EVERY 6 HOURS PRN
Status: DISCONTINUED | OUTPATIENT
Start: 2025-02-12 | End: 2025-02-14 | Stop reason: HOSPADM

## 2025-02-12 RX ORDER — DIPHENHYDRAMINE HYDROCHLORIDE 50 MG/ML
25 INJECTION INTRAMUSCULAR; INTRAVENOUS EVERY 6 HOURS PRN
Status: DISCONTINUED | OUTPATIENT
Start: 2025-02-12 | End: 2025-02-14 | Stop reason: HOSPADM

## 2025-02-12 RX ORDER — NIFEDIPINE 30 MG
30 TABLET, EXTENDED RELEASE ORAL DAILY
Status: DISCONTINUED | OUTPATIENT
Start: 2025-02-12 | End: 2025-02-14

## 2025-02-12 RX ORDER — CALCIUM CARBONATE 500 MG/1
1000 TABLET, CHEWABLE ORAL EVERY 6 HOURS PRN
Status: DISCONTINUED | OUTPATIENT
Start: 2025-02-12 | End: 2025-02-14 | Stop reason: HOSPADM

## 2025-02-12 RX ORDER — FAMOTIDINE 20 MG/1
20 TABLET, FILM COATED ORAL 2 TIMES DAILY
COMMUNITY
End: 2025-02-27

## 2025-02-12 RX ORDER — ONDANSETRON 2 MG/ML
4 INJECTION INTRAMUSCULAR; INTRAVENOUS EVERY 6 HOURS PRN
Status: DISCONTINUED | OUTPATIENT
Start: 2025-02-12 | End: 2025-02-14 | Stop reason: HOSPADM

## 2025-02-12 RX ADMIN — PRENATAL VIT W/ FE FUMARATE-FA TAB 27-0.8 MG 1 TABLET: 27-0.8 TAB at 21:54

## 2025-02-12 RX ADMIN — LABETALOL HYDROCHLORIDE 400 MG: 200 TABLET ORAL at 21:07

## 2025-02-12 RX ADMIN — NIFEDIPINE 30 MG: 30 TABLET, EXTENDED RELEASE ORAL at 18:45

## 2025-02-12 ASSESSMENT — ACTIVITIES OF DAILY LIVING (ADL)
ADLS_ACUITY_SCORE: 15
ADLS_ACUITY_SCORE: 20
ADLS_ACUITY_SCORE: 20
ADLS_ACUITY_SCORE: 15
ADLS_ACUITY_SCORE: 20
ADLS_ACUITY_SCORE: 20

## 2025-02-13 ENCOUNTER — ANCILLARY PROCEDURE (OUTPATIENT)
Dept: ULTRASOUND IMAGING | Facility: HOSPITAL | Age: 35
End: 2025-02-13
Attending: OBSTETRICS & GYNECOLOGY
Payer: COMMERCIAL

## 2025-02-13 VITALS
SYSTOLIC BLOOD PRESSURE: 141 MMHG | WEIGHT: 245.4 LBS | DIASTOLIC BLOOD PRESSURE: 70 MMHG | BODY MASS INDEX: 41.89 KG/M2 | HEIGHT: 64 IN | TEMPERATURE: 98.3 F | OXYGEN SATURATION: 98 % | RESPIRATION RATE: 18 BRPM

## 2025-02-13 PROBLEM — O11.9 PRE-ECLAMPSIA SUPERIMPOSED ON CHRONIC HYPERTENSION, ANTEPARTUM: Status: ACTIVE | Noted: 2025-02-13

## 2025-02-13 LAB
ALBUMIN SERPL BCG-MCNC: 3.4 G/DL (ref 3.5–5.2)
ALP SERPL-CCNC: 123 U/L (ref 40–150)
ALP SERPL-CCNC: 127 U/L (ref 40–150)
ALP SERPL-CCNC: 134 U/L (ref 40–150)
ALT SERPL W P-5'-P-CCNC: 114 U/L (ref 0–50)
ALT SERPL W P-5'-P-CCNC: 140 U/L (ref 0–50)
ALT SERPL W P-5'-P-CCNC: 144 U/L (ref 0–50)
ANION GAP SERPL CALCULATED.3IONS-SCNC: 10 MMOL/L (ref 7–15)
ANION GAP SERPL CALCULATED.3IONS-SCNC: 11 MMOL/L (ref 7–15)
ANION GAP SERPL CALCULATED.3IONS-SCNC: 12 MMOL/L (ref 7–15)
APTT PPP: 24 SECONDS (ref 22–38)
APTT PPP: 26 SECONDS (ref 22–38)
AST SERPL W P-5'-P-CCNC: 162 U/L (ref 0–45)
AST SERPL W P-5'-P-CCNC: 174 U/L (ref 0–45)
AST SERPL W P-5'-P-CCNC: 93 U/L (ref 0–45)
BASOPHILS # BLD AUTO: 0 10E3/UL (ref 0–0.2)
BASOPHILS # BLD AUTO: 0 10E3/UL (ref 0–0.2)
BASOPHILS NFR BLD AUTO: 0 %
BASOPHILS NFR BLD AUTO: 0 %
BILIRUB SERPL-MCNC: 0.4 MG/DL
BUN SERPL-MCNC: 11.7 MG/DL (ref 6–20)
BUN SERPL-MCNC: 12.6 MG/DL (ref 6–20)
BUN SERPL-MCNC: 14.7 MG/DL (ref 6–20)
CALCIUM SERPL-MCNC: 8.9 MG/DL (ref 8.8–10.4)
CALCIUM SERPL-MCNC: 9.5 MG/DL (ref 8.8–10.4)
CALCIUM SERPL-MCNC: 9.9 MG/DL (ref 8.8–10.4)
CHLORIDE SERPL-SCNC: 100 MMOL/L (ref 98–107)
CHLORIDE SERPL-SCNC: 103 MMOL/L (ref 98–107)
CHLORIDE SERPL-SCNC: 105 MMOL/L (ref 98–107)
CREAT SERPL-MCNC: 0.55 MG/DL (ref 0.51–0.95)
CREAT SERPL-MCNC: 0.61 MG/DL (ref 0.51–0.95)
CREAT SERPL-MCNC: 0.62 MG/DL (ref 0.51–0.95)
EGFRCR SERPLBLD CKD-EPI 2021: >90 ML/MIN/1.73M2
EOSINOPHIL # BLD AUTO: 0 10E3/UL (ref 0–0.7)
EOSINOPHIL # BLD AUTO: 0 10E3/UL (ref 0–0.7)
EOSINOPHIL NFR BLD AUTO: 0 %
EOSINOPHIL NFR BLD AUTO: 0 %
ERYTHROCYTE [DISTWIDTH] IN BLOOD BY AUTOMATED COUNT: 14.6 % (ref 10–15)
FIBRINOGEN PPP-MCNC: 472 MG/DL (ref 170–510)
FIBRINOGEN PPP-MCNC: 484 MG/DL (ref 170–510)
GLUCOSE SERPL-MCNC: 122 MG/DL (ref 70–99)
GLUCOSE SERPL-MCNC: 152 MG/DL (ref 70–99)
GLUCOSE SERPL-MCNC: 89 MG/DL (ref 70–99)
HCO3 SERPL-SCNC: 20 MMOL/L (ref 22–29)
HCO3 SERPL-SCNC: 22 MMOL/L (ref 22–29)
HCO3 SERPL-SCNC: 24 MMOL/L (ref 22–29)
HCT VFR BLD AUTO: 40.6 % (ref 35–47)
HCT VFR BLD AUTO: 41.7 % (ref 35–47)
HCT VFR BLD AUTO: 43 % (ref 35–47)
HGB BLD-MCNC: 13.7 G/DL (ref 11.7–15.7)
HGB BLD-MCNC: 13.9 G/DL (ref 11.7–15.7)
HGB BLD-MCNC: 14.4 G/DL (ref 11.7–15.7)
IMM GRANULOCYTES # BLD: 0.1 10E3/UL
IMM GRANULOCYTES # BLD: 0.1 10E3/UL
IMM GRANULOCYTES NFR BLD: 1 %
IMM GRANULOCYTES NFR BLD: 1 %
INR PPP: 0.89 (ref 0.85–1.15)
INR PPP: 0.9 (ref 0.85–1.15)
LDH SERPL L TO P-CCNC: 232 U/L (ref 0–250)
LDH SERPL L TO P-CCNC: 294 U/L (ref 0–250)
LYMPHOCYTES # BLD AUTO: 1 10E3/UL (ref 0.8–5.3)
LYMPHOCYTES # BLD AUTO: 1.2 10E3/UL (ref 0.8–5.3)
LYMPHOCYTES NFR BLD AUTO: 10 %
LYMPHOCYTES NFR BLD AUTO: 8 %
MAGNESIUM SERPL-MCNC: 5.7 MG/DL (ref 1.7–2.3)
MCH RBC QN AUTO: 30.4 PG (ref 26.5–33)
MCH RBC QN AUTO: 30.5 PG (ref 26.5–33)
MCH RBC QN AUTO: 30.8 PG (ref 26.5–33)
MCHC RBC AUTO-ENTMCNC: 33.3 G/DL (ref 31.5–36.5)
MCHC RBC AUTO-ENTMCNC: 33.5 G/DL (ref 31.5–36.5)
MCHC RBC AUTO-ENTMCNC: 33.7 G/DL (ref 31.5–36.5)
MCV RBC AUTO: 91 FL (ref 78–100)
MCV RBC AUTO: 91 FL (ref 78–100)
MCV RBC AUTO: 92 FL (ref 78–100)
MONOCYTES # BLD AUTO: 0.3 10E3/UL (ref 0–1.3)
MONOCYTES # BLD AUTO: 0.4 10E3/UL (ref 0–1.3)
MONOCYTES NFR BLD AUTO: 3 %
MONOCYTES NFR BLD AUTO: 3 %
NEUTROPHILS # BLD AUTO: 10.2 10E3/UL (ref 1.6–8.3)
NEUTROPHILS # BLD AUTO: 10.8 10E3/UL (ref 1.6–8.3)
NEUTROPHILS NFR BLD AUTO: 86 %
NEUTROPHILS NFR BLD AUTO: 88 %
NRBC # BLD AUTO: 0 10E3/UL
NRBC # BLD AUTO: 0 10E3/UL
NRBC BLD AUTO-RTO: 0 /100
NRBC BLD AUTO-RTO: 0 /100
PLATELET # BLD AUTO: 165 10E3/UL (ref 150–450)
PLATELET # BLD AUTO: 170 10E3/UL (ref 150–450)
PLATELET # BLD AUTO: 176 10E3/UL (ref 150–450)
POTASSIUM SERPL-SCNC: 4.2 MMOL/L (ref 3.4–5.3)
POTASSIUM SERPL-SCNC: 4.4 MMOL/L (ref 3.4–5.3)
POTASSIUM SERPL-SCNC: 4.5 MMOL/L (ref 3.4–5.3)
PROT SERPL-MCNC: 5.9 G/DL (ref 6.4–8.3)
PROT SERPL-MCNC: 6.1 G/DL (ref 6.4–8.3)
PROT SERPL-MCNC: 6.3 G/DL (ref 6.4–8.3)
RBC # BLD AUTO: 4.45 10E6/UL (ref 3.8–5.2)
RBC # BLD AUTO: 4.55 10E6/UL (ref 3.8–5.2)
RBC # BLD AUTO: 4.73 10E6/UL (ref 3.8–5.2)
SODIUM SERPL-SCNC: 134 MMOL/L (ref 135–145)
SODIUM SERPL-SCNC: 134 MMOL/L (ref 135–145)
SODIUM SERPL-SCNC: 139 MMOL/L (ref 135–145)
WBC # BLD AUTO: 11.9 10E3/UL (ref 4–11)
WBC # BLD AUTO: 12.3 10E3/UL (ref 4–11)
WBC # BLD AUTO: 14.5 10E3/UL (ref 4–11)

## 2025-02-13 PROCEDURE — 82310 ASSAY OF CALCIUM: CPT | Performed by: OBSTETRICS & GYNECOLOGY

## 2025-02-13 PROCEDURE — 85610 PROTHROMBIN TIME: CPT | Performed by: OBSTETRICS & GYNECOLOGY

## 2025-02-13 PROCEDURE — 36415 COLL VENOUS BLD VENIPUNCTURE: CPT | Performed by: OBSTETRICS & GYNECOLOGY

## 2025-02-13 PROCEDURE — 85025 COMPLETE CBC W/AUTO DIFF WBC: CPT | Performed by: OBSTETRICS & GYNECOLOGY

## 2025-02-13 PROCEDURE — 83735 ASSAY OF MAGNESIUM: CPT | Performed by: OBSTETRICS & GYNECOLOGY

## 2025-02-13 PROCEDURE — 85384 FIBRINOGEN ACTIVITY: CPT | Performed by: OBSTETRICS & GYNECOLOGY

## 2025-02-13 PROCEDURE — 84132 ASSAY OF SERUM POTASSIUM: CPT | Performed by: OBSTETRICS & GYNECOLOGY

## 2025-02-13 PROCEDURE — 99222 1ST HOSP IP/OBS MODERATE 55: CPT | Mod: 25 | Performed by: STUDENT IN AN ORGANIZED HEALTH CARE EDUCATION/TRAINING PROGRAM

## 2025-02-13 PROCEDURE — 76819 FETAL BIOPHYS PROFIL W/O NST: CPT | Mod: 26 | Performed by: STUDENT IN AN ORGANIZED HEALTH CARE EDUCATION/TRAINING PROGRAM

## 2025-02-13 PROCEDURE — 250N000013 HC RX MED GY IP 250 OP 250 PS 637: Performed by: OBSTETRICS & GYNECOLOGY

## 2025-02-13 PROCEDURE — 83615 LACTATE (LD) (LDH) ENZYME: CPT | Performed by: OBSTETRICS & GYNECOLOGY

## 2025-02-13 PROCEDURE — 84075 ASSAY ALKALINE PHOSPHATASE: CPT | Performed by: OBSTETRICS & GYNECOLOGY

## 2025-02-13 PROCEDURE — 76819 FETAL BIOPHYS PROFIL W/O NST: CPT

## 2025-02-13 PROCEDURE — 85014 HEMATOCRIT: CPT | Performed by: OBSTETRICS & GYNECOLOGY

## 2025-02-13 PROCEDURE — 84155 ASSAY OF PROTEIN SERUM: CPT | Performed by: OBSTETRICS & GYNECOLOGY

## 2025-02-13 PROCEDURE — 250N000013 HC RX MED GY IP 250 OP 250 PS 637

## 2025-02-13 PROCEDURE — 59025 FETAL NON-STRESS TEST: CPT | Mod: 26 | Performed by: STUDENT IN AN ORGANIZED HEALTH CARE EDUCATION/TRAINING PROGRAM

## 2025-02-13 PROCEDURE — 120N000001 HC R&B MED SURG/OB

## 2025-02-13 PROCEDURE — 87653 STREP B DNA AMP PROBE: CPT | Performed by: OBSTETRICS & GYNECOLOGY

## 2025-02-13 PROCEDURE — 85041 AUTOMATED RBC COUNT: CPT | Performed by: OBSTETRICS & GYNECOLOGY

## 2025-02-13 PROCEDURE — 999N000248 HC STATISTIC IV INSERT WITH US BY RN

## 2025-02-13 PROCEDURE — 250N000011 HC RX IP 250 OP 636: Performed by: OBSTETRICS & GYNECOLOGY

## 2025-02-13 PROCEDURE — 258N000003 HC RX IP 258 OP 636: Performed by: OBSTETRICS & GYNECOLOGY

## 2025-02-13 PROCEDURE — 85730 THROMBOPLASTIN TIME PARTIAL: CPT | Performed by: OBSTETRICS & GYNECOLOGY

## 2025-02-13 RX ORDER — FAMOTIDINE 20 MG/1
20 TABLET, FILM COATED ORAL 2 TIMES DAILY
Status: DISCONTINUED | OUTPATIENT
Start: 2025-02-13 | End: 2025-02-17 | Stop reason: HOSPADM

## 2025-02-13 RX ORDER — MAGNESIUM SULFATE 4 G/50ML
4 INJECTION INTRAVENOUS
Status: DISCONTINUED | OUTPATIENT
Start: 2025-02-13 | End: 2025-02-14

## 2025-02-13 RX ORDER — MAGNESIUM SULFATE IN WATER 40 MG/ML
2 INJECTION, SOLUTION INTRAVENOUS CONTINUOUS
Status: DISCONTINUED | OUTPATIENT
Start: 2025-02-13 | End: 2025-02-14

## 2025-02-13 RX ORDER — LIDOCAINE 40 MG/G
CREAM TOPICAL
Status: DISCONTINUED | OUTPATIENT
Start: 2025-02-13 | End: 2025-02-14

## 2025-02-13 RX ORDER — LABETALOL HYDROCHLORIDE 5 MG/ML
20-80 INJECTION, SOLUTION INTRAVENOUS EVERY 10 MIN PRN
Status: DISCONTINUED | OUTPATIENT
Start: 2025-02-13 | End: 2025-02-14

## 2025-02-13 RX ORDER — HYDRALAZINE HYDROCHLORIDE 20 MG/ML
10 INJECTION INTRAMUSCULAR; INTRAVENOUS
Status: DISCONTINUED | OUTPATIENT
Start: 2025-02-13 | End: 2025-02-14

## 2025-02-13 RX ORDER — BETAMETHASONE SODIUM PHOSPHATE AND BETAMETHASONE ACETATE 3; 3 MG/ML; MG/ML
12 INJECTION, SUSPENSION INTRA-ARTICULAR; INTRALESIONAL; INTRAMUSCULAR; SOFT TISSUE EVERY 24 HOURS
Status: COMPLETED | OUTPATIENT
Start: 2025-02-13 | End: 2025-02-14

## 2025-02-13 RX ORDER — CALCIUM GLUCONATE 94 MG/ML
1 INJECTION, SOLUTION INTRAVENOUS
Status: DISCONTINUED | OUTPATIENT
Start: 2025-02-13 | End: 2025-02-14

## 2025-02-13 RX ORDER — LABETALOL 200 MG/1
600 TABLET, FILM COATED ORAL EVERY 8 HOURS SCHEDULED
Status: DISCONTINUED | OUTPATIENT
Start: 2025-02-13 | End: 2025-02-14

## 2025-02-13 RX ORDER — SODIUM CHLORIDE, SODIUM LACTATE, POTASSIUM CHLORIDE, CALCIUM CHLORIDE 600; 310; 30; 20 MG/100ML; MG/100ML; MG/100ML; MG/100ML
10-125 INJECTION, SOLUTION INTRAVENOUS CONTINUOUS
Status: DISCONTINUED | OUTPATIENT
Start: 2025-02-13 | End: 2025-02-17 | Stop reason: HOSPADM

## 2025-02-13 RX ORDER — MAGNESIUM SULFATE HEPTAHYDRATE 40 MG/ML
2 INJECTION, SOLUTION INTRAVENOUS ONCE
Status: COMPLETED | OUTPATIENT
Start: 2025-02-13 | End: 2025-02-13

## 2025-02-13 RX ORDER — MAGNESIUM SULFATE HEPTAHYDRATE 40 MG/ML
2 INJECTION, SOLUTION INTRAVENOUS
Status: DISCONTINUED | OUTPATIENT
Start: 2025-02-13 | End: 2025-02-14

## 2025-02-13 RX ORDER — LABETALOL 200 MG/1
200 TABLET, FILM COATED ORAL ONCE
Status: COMPLETED | OUTPATIENT
Start: 2025-02-13 | End: 2025-02-13

## 2025-02-13 RX ORDER — MAGNESIUM SULFATE 4 G/50ML
4 INJECTION INTRAVENOUS ONCE
Status: COMPLETED | OUTPATIENT
Start: 2025-02-13 | End: 2025-02-13

## 2025-02-13 RX ADMIN — FAMOTIDINE 20 MG: 20 TABLET, FILM COATED ORAL at 02:11

## 2025-02-13 RX ADMIN — LABETALOL HYDROCHLORIDE 400 MG: 200 TABLET ORAL at 06:04

## 2025-02-13 RX ADMIN — DOCUSATE SODIUM 100 MG: 100 CAPSULE, LIQUID FILLED ORAL at 08:43

## 2025-02-13 RX ADMIN — MAGNESIUM SULFATE HEPTAHYDRATE 2 G: 40 INJECTION, SOLUTION INTRAVENOUS at 09:14

## 2025-02-13 RX ADMIN — SODIUM CHLORIDE, POTASSIUM CHLORIDE, SODIUM LACTATE AND CALCIUM CHLORIDE 75 ML/HR: 600; 310; 30; 20 INJECTION, SOLUTION INTRAVENOUS at 22:00

## 2025-02-13 RX ADMIN — FAMOTIDINE 20 MG: 20 TABLET, FILM COATED ORAL at 08:43

## 2025-02-13 RX ADMIN — BETAMETHASONE SODIUM PHOSPHATE AND BETAMETHASONE ACETATE 12 MG: 3; 3 INJECTION, SUSPENSION INTRA-ARTICULAR; INTRALESIONAL; INTRAMUSCULAR at 08:45

## 2025-02-13 RX ADMIN — LABETALOL HYDROCHLORIDE 600 MG: 200 TABLET ORAL at 22:10

## 2025-02-13 RX ADMIN — MAGNESIUM SULFATE HEPTAHYDRATE 4 G: 80 INJECTION, SOLUTION INTRAVENOUS at 09:14

## 2025-02-13 RX ADMIN — LABETALOL HYDROCHLORIDE 200 MG: 200 TABLET ORAL at 08:43

## 2025-02-13 RX ADMIN — MAGNESIUM SULFATE HEPTAHYDRATE 2 G/HR: 40 INJECTION, SOLUTION INTRAVENOUS at 09:53

## 2025-02-13 RX ADMIN — SODIUM CHLORIDE, POTASSIUM CHLORIDE, SODIUM LACTATE AND CALCIUM CHLORIDE 75 ML/HR: 600; 310; 30; 20 INJECTION, SOLUTION INTRAVENOUS at 09:11

## 2025-02-13 RX ADMIN — LABETALOL HYDROCHLORIDE 600 MG: 200 TABLET ORAL at 13:50

## 2025-02-13 RX ADMIN — PRENATAL VIT W/ FE FUMARATE-FA TAB 27-0.8 MG 1 TABLET: 27-0.8 TAB at 22:10

## 2025-02-13 RX ADMIN — DOCUSATE SODIUM 100 MG: 100 CAPSULE, LIQUID FILLED ORAL at 22:10

## 2025-02-13 RX ADMIN — FAMOTIDINE 20 MG: 20 TABLET, FILM COATED ORAL at 22:09

## 2025-02-13 RX ADMIN — NIFEDIPINE 30 MG: 30 TABLET, EXTENDED RELEASE ORAL at 08:43

## 2025-02-13 ASSESSMENT — ACTIVITIES OF DAILY LIVING (ADL)
ADLS_ACUITY_SCORE: 21
ADLS_ACUITY_SCORE: 20
ADLS_ACUITY_SCORE: 21
ADLS_ACUITY_SCORE: 21
ADLS_ACUITY_SCORE: 20
ADLS_ACUITY_SCORE: 21
ADLS_ACUITY_SCORE: 20
ADLS_ACUITY_SCORE: 21
ADLS_ACUITY_SCORE: 20
ADLS_ACUITY_SCORE: 21
ADLS_ACUITY_SCORE: 21
ADLS_ACUITY_SCORE: 20
ADLS_ACUITY_SCORE: 21
ADLS_ACUITY_SCORE: 20
ADLS_ACUITY_SCORE: 21
ADLS_ACUITY_SCORE: 20

## 2025-02-13 NOTE — PROGRESS NOTES
Dr. Lovell updated on patient status, FHR tracing, and BPP. Plan to draw some more labs and collect a GBS.

## 2025-02-13 NOTE — PLAN OF CARE
Problem: Adult Inpatient Plan of Care  Goal: Plan of Care Review  Description: The Plan of Care Review/Shift note should be completed every shift.  The Outcome Evaluation is a brief statement about your assessment that the patient is improving, declining, or no change.  This information will be displayed automatically on your shift  note.  2/13/2025 0024 by Brittney Ramey RN  Outcome: Progressing  2/12/2025 2016 by Brittney Ramey RN  Outcome: Progressing  Flowsheets (Taken 2/12/2025 2016)  Plan of Care Reviewed With: patient  Overall Patient Progress: improving     Problem: Adult Inpatient Plan of Care  Goal: Optimal Comfort and Wellbeing  2/13/2025 0024 by Brittney Ramey RN  Outcome: Progressing  2/12/2025 2016 by Brittney Ramey RN  Outcome: Progressing  Intervention: Provide Person-Centered Care  Recent Flowsheet Documentation  Taken 2/12/2025 2357 by Brittney Ramey RN  Trust Relationship/Rapport:   care explained   choices provided   emotional support provided   empathic listening provided   questions encouraged   questions answered   reassurance provided   thoughts/feelings acknowledged  Taken 2/12/2025 1950 by Brittney Ramey RN  Trust Relationship/Rapport:   care explained   choices provided   emotional support provided   empathic listening provided   questions answered   questions encouraged   reassurance provided   thoughts/feelings acknowledged     Problem: Hypertensive Disorders in Pregnancy  Goal: Patient-Fetal Stabilization  2/13/2025 0024 by Brittney Ramey RN  Outcome: Progressing  2/12/2025 2016 by Brittney Ramey RN  Outcome: Progressing  Intervention: Optimize Blood Pressure and Fluid Status  Recent Flowsheet Documentation  Taken 2/12/2025 2357 by Brittney Ramey RN  Fetal Wellbeing Promotion:   fetal heart rate monitored   uterine contraction activity assessed  Fluid/Electrolyte Management: fluids provided  Taken 2/12/2025 2120 by Brittney Ramey RN  Fetal Wellbeing Promotion:   fetal heart  rate monitored   uterine contraction activity assessed  Fluid/Electrolyte Management: fluids provided   Goal Outcome Evaluation:      Plan of Care Reviewed With: patient    Overall Patient Progress: improvingOverall Patient Progress: improving     VSS, , reactive, appropriate for gestational age. Denies pain. Slept well through the night, refused Vistaril. Consult for MFM today please follow through.

## 2025-02-13 NOTE — PROGRESS NOTES
"OB ANTEPARTUM PROGRESS NOTE    IUP at 30w1d, admitted for Elevated Blood pressures     SUBJECTIVE:  Patient feels well. She has no new complaints. Patient states the baby is active, but as she just woke up, she has not felt a lot of movements yet. She is experiencing no contractions.  Patient denies headache, change in vision or upper abdominal pain. She admits to some mild acid reflux / heartburn that is controlled with pepcid at home and here at the hospital. No leaking or discharge.      OBJECTIVE:  BP (!) 141/99   Temp 98.3  F (36.8  C) (Oral)   Resp 18   Ht 1.626 m (5' 4\")   Wt 113.4 kg (250 lb)   BMI 42.91 kg/m     Abd: gravid  NST: baseline 140, moderate variability, 10x10 + accelerations present, decelerations absent  TOCO: no contractions  Gen: NAD  Pulm: CTAB, no w/r/r  CV: RRR  Abd: soft, NT, gravid  Extr: 2+ pitting edema bilateral up to knees, DTRs 2+ bilateral LE, no clonus    LABS:  Recent Results (from the past week)   Protein  random urine   Result Value Ref Range Status    Total Protein Urine mg/dL 295.9   mg/dL Final    Total Protein Urine mg/mg Creat 2.38 (H) 0.00 - 0.20 mg/mg Cr Final    Creatinine Urine mg/dL 124.5 mg/dL Final   CBC with platelets   Result Value Ref Range Status    WBC Count 9.9 4.0 - 11.0 10e3/uL Final    RBC Count 4.49 3.80 - 5.20 10e6/uL Final    Hemoglobin 13.5 11.7 - 15.7 g/dL Final    Hematocrit 40.7 35.0 - 47.0 % Final    MCV 91 78 - 100 fL Final    MCH 30.1 26.5 - 33.0 pg Final    MCHC 33.2 31.5 - 36.5 g/dL Final    RDW 14.5 10.0 - 15.0 % Final    Platelet Count 205 150 - 450 10e3/uL Final   Comprehensive metabolic panel   Result Value Ref Range Status    Sodium 139 135 - 145 mmol/L Final    Potassium 4.5 3.4 - 5.3 mmol/L Final    Carbon Dioxide (CO2) 23 22 - 29 mmol/L Final    Anion Gap 11 7 - 15 mmol/L Final    Urea Nitrogen 14.4 6.0 - 20.0 mg/dL Final    Creatinine 0.61 0.51 - 0.95 mg/dL Final    GFR Estimate >90 >60 mL/min/1.73m2 Final    Calcium 10.0 8.8 - " 10.4 mg/dL Final    Chloride 105 98 - 107 mmol/L Final    Glucose 83 70 - 99 mg/dL Final    Alkaline Phosphatase 122 40 - 150 U/L Final    AST 30 0 - 45 U/L Final    ALT 28 0 - 50 U/L Final    Protein Total 5.9 (L) 6.4 - 8.3 g/dL Final    Albumin 3.3 (L) 3.5 - 5.2 g/dL Final    Bilirubin Total <0.2 <=1.2 mg/dL Final     *Note: Due to a large number of results and/or encounters for the requested time period, some results have not been displayed. A complete set of results can be found in Results Review.     Last Comprehensive Metabolic Panel:  Sodium   Date Value Ref Range Status   2025 139 135 - 145 mmol/L Final     Potassium   Date Value Ref Range Status   2025 4.2 3.4 - 5.3 mmol/L Final     Chloride   Date Value Ref Range Status   2025 105 98 - 107 mmol/L Final     Carbon Dioxide (CO2)   Date Value Ref Range Status   2025 24 22 - 29 mmol/L Final     Anion Gap   Date Value Ref Range Status   2025 10 7 - 15 mmol/L Final     Glucose   Date Value Ref Range Status   2025 89 70 - 99 mg/dL Final     Urea Nitrogen   Date Value Ref Range Status   2025 12.6 6.0 - 20.0 mg/dL Final     Creatinine   Date Value Ref Range Status   2025 0.61 0.51 - 0.95 mg/dL Final     GFR Estimate   Date Value Ref Range Status   2025 >90 >60 mL/min/1.73m2 Final     Comment:     eGFR calculated using  CKD-EPI equation.     Calcium   Date Value Ref Range Status   2025 9.9 8.8 - 10.4 mg/dL Final     Bilirubin Total   Date Value Ref Range Status   2025 0.4 <=1.2 mg/dL Final     Alkaline Phosphatase   Date Value Ref Range Status   2025 123 40 - 150 U/L Final     ALT   Date Value Ref Range Status   2025 140 (H) 0 - 50 U/L Final     AST   Date Value Ref Range Status   2025 174 (H) 0 - 45 U/L Final       LABS: GBS unknown    ASSESSMENT:  34 year old  at 30w1d admitted last night with elevated BPs in the setting of CHTN. Did require titration of PO medications, but  no persistent severe features. Now, this morning, meeting criteria for superimposed preeclampsia with severe features based on LFTs up to 140/174 since last night.      1. Fetal status reassuring:               - NST reactive for gestational age. CEFM now.               -Growth US with MFM 2/5: EFW 35%, AC 38%              -Repeat growth due on 2/26 if still inpatient  2. Prenatal labs              - Blood type O+              - Rubella Immune              - GBS unknown  3. CHTN              -Titrated to Labetalol 600mg BID and procardia 30mg XL daily              -Magnesium sulfate starting 6g/2g for NP and severe siPreE   - Betamethasone course to start today  - MFM consulted; appreciate recs  - Trending labs q 6 hours for now, adding LDH     4. AMA  - low risk NIPT  5. Obesity BMI 42 - lovenox PP  6. IVF pregnancy        Magalie Lovell MD

## 2025-02-13 NOTE — CONSULTS
"  Maternal-Fetal Medicine Consultation    Sarah Parada  : 1990  MRN: [id number]    REFERRAL:  Sarah Parada is a 34 year old sent by Dr. Lovell for MFM consultation.    HPI:  Sarah Parada is a 34 year old  at 30w1d by IVF dating consistent with 9w6d US here for MFM consultation regarding chronic hypertension with superimposed preeclampsia with severe features.    Sarah was diagnosed with chronic hypertension in the current pregnancy based on persistent mild range blood pressures. She was started on labetalol 200mg BID and low dose aspirin at 16w6d due to persistent mild range blood pressures. Baseline laboratory assessment within normal limits (no proteinuria, normal LFTs). She has a blood pressure cuff at home and has been checking. During prenatal care she has required escalation of medications to labetalol 400mg BID at 20w0d and labetalol 400mg TID at 25w6d. At her 29w0d visit she reported increased lower extremity edema. At her 30w0d prenatal visit, she was noted to have severe range blood pressures (174/112). Noted increased lower extremity edema and reports \"feeling off\". Upon admission the hospital she had a non sustained severe range blood pressure but persistent mild range blood pressures. Nifedipine 30mg every day was added to her antihypertensive regimen. Labs on admission (25) noted Hct 40.7, Plt 205, Cr 0.61, ALT 28, AST 30, PrCr 2.38. Repeat laboratory assessment this morning (25) noted Hct 41.7, Plt 165, Cr 0.61, , . With newly elevated LFTs twice the upper limit of normal she now has a diagnosis of superimposed preeclampsia with severe features. Endorses very mild headache (2/10) that started with magnesium. Otherwise denies vision changes or abdominal pain.     Pregnancy additionally complicated by:  -IVF pregnancy  -AMA (35 at delivery)  -Pre Pregnancy BMI 42  -Marginal placental cord insertion    Obstetrics History:  OB History    Para Term "  AB Living   1 0 0 0 0 0   SAB IAB Ectopic Multiple Live Births   0 0 0 0 0      # Outcome Date GA Lbr Neil/2nd Weight Sex Type Anes PTL Lv   1 Current              Past Medical History:  Past Medical History:   Diagnosis Date    Hypertension     Uterine polyp      Past Surgical History:  Past Surgical History:   Procedure Laterality Date    APPENDECTOMY  2020    DILATION AND CURETTAGE, OPERATIVE HYSTEROSCOPY WITH MORCELLATOR, COMBINED N/A 2024    Procedure: HYSTEROSCOPY DILATION AND CURETTAGE WITH MYOSURE;  Surgeon: Nieves Smith MD;  Location: [facility] OR    LAPAROSCOPY  2021    LAPAROSCOPY  2020     Current Medications:  Prior to Admission medications    Medication Sig Last Dose Taking? Auth Provider Long Term End Date   famotidine (PEPCID) 20 MG tablet Take 20 mg by mouth 2 times daily.  Yes Reported, Patient     aspirin 81 MG EC tablet Take 162 mg by mouth daily.   Reported, Patient     labetalol (NORMODYNE) 100 MG tablet Take 400 mg by mouth 3 times daily.   Reported, Patient     Prenatal Vit-Fe Fumarate-FA (PRENATAL MULTIVITAMIN  PLUS IRON) 27-1 MG TABS Take 1 tablet by mouth daily.   Reported, Patient       Allergies:  Patient has no known allergies.    Social History:   Denies use of alcohol, drugs or smoking.    PHYSICAL EXAM:  Gen: NAD  Card: regular rate  Pulm: breathing comfortably on room air  Abd: soft, non tender  Extrem: 2+ bilateral lower edema to knees, SCDs in place    Other Imagin/5/25 [facility] US  Anatomy within normal limits  Normal amniotic fluid  EFW 1310g (35%), AC 35%    Fetal Monitoring (reviewed through 1:00pm):   FHR: 135/mod/accel/rare shallow variable decelerations  [place]: 0/10 averaged over 30 minutes  Interpretation: reassuring, appropriate for gestational age    Labs:   Component      Latest Ref Rng 2025  6:16 PM 2025  6:28 AM   Sodium      135 - 145 mmol/L 139  139    Potassium      3.4 - 5.3 mmol/L 4.5  4.2    Carbon Dioxide  (CO2)      22 - 29 mmol/L 23  24    Anion Gap      7 - 15 mmol/L 11  10    Urea Nitrogen      6.0 - 20.0 mg/dL 14.4  12.6    Creatinine      0.51 - 0.95 mg/dL 0.61  0.61    GFR Estimate      >60 mL/min/1.73m2 >90  >90    Calcium      8.8 - 10.4 mg/dL 10.0  9.9    Chloride      98 - 107 mmol/L 105  105    Glucose      70 - 99 mg/dL 83  89    Alkaline Phosphatase      40 - 150 U/L 122  123    AST      0 - 45 U/L 30  174 (H)    ALT      0 - 50 U/L 28  140 (H)    Protein Total      6.4 - 8.3 g/dL 5.9 (L)  5.9 (L)    Albumin      3.5 - 5.2 g/dL 3.3 (L)  3.4 (L)    Bilirubin Total      <=1.2 mg/dL <0.2  0.4    WBC      4.0 - 11.0 10e3/uL 9.9  14.5 (H)    RBC Count      3.80 - 5.20 10e6/uL 4.49  4.55    Hemoglobin      11.7 - 15.7 g/dL 13.5  13.9    Hematocrit      35.0 - 47.0 % 40.7  41.7    MCV      78 - 100 fL 91  92    MCH      26.5 - 33.0 pg 30.1  30.5    MCHC      31.5 - 36.5 g/dL 33.2  33.3    RDW      10.0 - 15.0 % 14.5  14.6    Platelet Count      150 - 450 10e3/uL 205  165       Component      Latest Ref Rng 2025  5:44 PM   Total Protein Urine mg/dL        mg/dL 295.9    Total Protein Urine mg/mg Creat      0.00 - 0.20 mg/mg Cr 2.38 (H)    Creatinine Urine      mg/dL 124.5       Component      Latest Ref Rng 2025  6:16 PM   INR      0.85 - 1.15  0.90    PTT      22 - 38 Seconds 25      ASSESSMENT/PLAN:  Sarah Parada is a 34 year old  at 30w1d by IVF dating consistent with 9w6d US here for MFM consultation regarding chronic hypertension with superimposed preeclampsia with severe features (LFT criteria).    #Preeclampsia with Severe Features (LFT criteria)  Patient's history is significant for chronic hypertension with superimposed preeclampsia with severe features based on proteinuria, worsening blood pressures, and elevated liver enzymes. We reviewed that a diagnosis of preeclampsia with severe features is established when in a patient with previously normal blood pressures outside of pregnancy  has new onset blood pressure > 160 systolic and/or 110 diastolic hypertension that is persistent and requires treatment. Additional symptoms and laboratory changes may also contribute to the diagnosis if only mild range blood pressures are observed.    There is no cure or treatment for preeclampsia and that the natural history is for the disease to worsen and that the only intervention that improves the maternal condition is delivery. We discussed that with a  diagnosis an attempt is made to weigh the maternal and fetal risks of continuing the pregnancy against the risks of premature delivery. Maternal and fetal risks with preeclampsia include seizure, stroke, uncontrolled hypertension, renal failure, liver failure, DIC, abruption and stillbirth. Some of the risks of  delivery include NICU admission, respiratory morbidity, intracranial hemorrhage, necrotizing enterocolitis, anemia, hyperbilirubinemia, electrolyte abnormalities. We discussed the role of  corticosteroids in reducing the risks of prematurity-related complications in women deemed to be high risk for  delivery.      Once a patient is diagnosed with preeclampsia with severe features hospitalization until delivery is recommended. Blood pressure > 160 systolic or 110 diastolic which is sustained over 15 minutes requires immediate treatment. Maternal antihypertensive therapy should be administered antepartum, intrapartum and postpartum as necessary to prevent maternal stroke. Once acute therapy has brought the blood pressure into an acceptable range, oral medication can be started with the goal of delivery at 34 weeks. The goal blood pressure is <140/90.  For women with preeclampsia with severe features the use of magnesium sulfate during delivery (labor or ) and for 24 hours postpartum is recommended.     The goal gestational age for delivery is 34 & 0/7 weeks unless the patient has a contraindication to expectant  management as outline in ACOG's Practice Bulletin (#222). Conditions precluding expectant management, include maternal findings of uncontrolled severe range blood pressures not responsive to antihypertensives, persistent headache refractory to treatment, epigastric pain or RUQ pain unresponsive to repeat analgesics, visual disturbances, motor deficit or altered sensorium, stroke, myocardial infarction, HELLP syndrome, new or worsening renal dysfunction (>1.1 mg/dL or 2x baseline), pulmonary edema, eclampsia, or suspected acute placental abruption or vaginal bleeding in the absence of placenta previa; and fetal findings of abnormal fetal testing, fetal death, fetus without expectation for survival at the time of maternal diagnosis (lethal anomaly, extreme prematurity), or persistent reversed end diastolic flow in the umbilical artery. Magnesium sulfate infusion is recommended during induction of labor (and during  delivery) and for 24 hours post-partum. Postpartum blood pressure should be monitored for at least 72 hours and then again at 7 days.  Women being discharged should have a thorough review of calling guidelines.      Recommendations:  Expectant management in the hospital until delivery. Goal of 34w0d unless contraindications develops (see ACOG's PB #222)  Contraindications to expectant management include:   uncontrolled severe range blood pressures not responsive to antihypertensives, persistent headache refractory to treatment, epigastric pain or RUQ pain unresponsive to repeat analgesics, visual disturbances, motor deficit or altered sensorium, stroke, myocardial infarction, HELLP syndrome, new or worsening renal dysfunction (>1.1 mg/dL or 2x baseline), pulmonary edema, eclampsia, or suspected acute placental abruption or vaginal bleeding in the absence of placenta previa  abnormal fetal testing, fetal death, fetus without expectation for survival at the time of maternal diagnosis (lethal anomaly,  extreme prematurity), or persistent reversed end diastolic flow in the umbilical artery  Monitoring should include:  Maternal vital signs per hospital policy. Blood pressure may need to be monitored more frequently if severe range. If sustained SBPs >160 and/or DBPs >110, she will require treatment with immediate release IV/PO antihypertensives. If unable to be controlled this would be an indication for delivery. If persistently >140/90 would increase titration of oral medications.  Strict I&Os and daily weights  Symptoms of preeclampsia should be monitored at least every 8 hours  Presence of contractions, membrane rupture, abdominal pain and bleeding should be monitored at least every 8 hours  Repeat CBC, CMP, LDH, coagulation studies at noon (6 hours from last). If stable continue serial labs every 8 hours through betamethasone window. If improve/stabilize can consider spacing further but should be checked at least daily and with symptoms. If there is worsening AST/ALT or evidence of HELLP syndrome, delivery is recommended.  Continuous fetal monitoring while on magnesium.  Weekly BPP with MFM and serial growth assessment every 3 weeks.  Prophylactic anticoagulation should be initiated if stable and hospitalized for >72 hours and delivery not felt to be imminent.  Recommend NICU consultation in anticipation of  delivery.  Recommend consultation with anesthesiology given diagnosis of preeclampsia with severe features.  Administer betamethasone course. Consider repeating if greater than 1-2 weeks out from previous dose and delivery is impending  Initiate magnesium until laboratory trend is established. This should be continued throughout the intrapartum course and 24 hours postpartum. Of note, pulmonary edema is a relevant contraindication to magnesium and thus I would not recommend administration if present.  No contraindication to vaginal delivery.  For women who delivery <34 weeks due to severe  preeclampsia, evaluation for antiphospholipid antibody syndrome is recommended.   Aspirin (81 mg daily) for preeclampsia prophylaxis is recommended in future pregnancies, started between 12-16 weeks.    Thank you for allowing us to participate in the care of your patient. Please do not hesitate to contact us if you have further questions regarding the management of your patient.     I have seen and evaluated the patient. I spent a total of 60 minutes on the date of this encounter including preparing to see the patient (reviewing medical records/tests), counseling and discussing the plan of care, documenting the visit in the electronic medical record, and communicating with other health care professionals and/or care coordination.      Ludy Staley MD  Maternal Fetal Medicine   2/13/2025 9:56 AM

## 2025-02-13 NOTE — PROGRESS NOTES
Data: [facility]: 2025  5:31 PM.  Reason for maternal/fetal assessment is hypertension disorder of pregnancy and elevated blood pressures. Patient was sent to the hospital for evaluation from the clinic due to having elevated blood pressures. Patient denies uterine contractions, leaking of vaginal fluid/rupture of membranes, vaginal bleeding, abdominal pain, pelvic pressure, nausea, vomiting, headache, visual disturbances, epigastric or RUQ pain. Patient reports fetal movement is normal. Patient is a 30w0d .  Prenatal record reviewed. Pregnancy has been complicated by hypertension and obesity (pre-pregnancy BMI >=35).    Vital signs  blood pressures are elevated upon arrival, will continue with serial blood pressures . Support person is not present.     Action: Verbal consent for EFM. Triage assessment completed.     Response: Dr. Ch aware of patient arrival and orders placed for pre-eclampsia labs.

## 2025-02-13 NOTE — PLAN OF CARE
Problem: Hypertensive Disorders in Pregnancy  Goal: Patient-Fetal Stabilization  Outcome: Progressing   Goal Outcome Evaluation:      Plan of Care Reviewed With: patient    Overall Patient Progress: improvingOverall Patient Progress: improving    Outcome Evaluation: Dr. Tinoco at bedisde this morning to see patient. Patient's ALT and AST increased significantly overnight. M has been consulted and will come see patient today, as well as do a US. Plan to give betamethasone and start MgSO4 for seizure prophlaxis.

## 2025-02-13 NOTE — PROGRESS NOTES
"Antepartum update note:     Went to review plan of care and MFM recommendations with pt.   She is resting comfortably, but emotional. No new symptoms of HA, CP, SOB, N/V, severe abd pain. Still some edema. She is handling the magnesium well.     Vital signs:  Temp: 98.1  F (36.7  C) Temp src: Oral BP: 136/84     Resp: 20 SpO2: 96 %     Height: 162.6 cm (5' 4\") Weight: 111.3 kg (245 lb 6.4 oz)  Estimated body mass index is 42.12 kg/m  as calculated from the following:    Height as of this encounter: 1.626 m (5' 4\").    Weight as of this encounter: 111.3 kg (245 lb 6.4 oz).     Gen: NAD  Pulm: normal WOB  CV: Regular rate  Abd: soft, gravid, NT  Extr: 2+ edema bilateral LE    AST   Date Value Ref Range Status   2025 162 (H) 0 - 45 U/L Final     ALT   Date Value Ref Range Status   2025 144 (H) 0 - 50 U/L Final     Platelet Count   Date Value Ref Range Status   2025 170 150 - 450 10e3/uL Final     Lactate Dehydrogenase   Date Value Ref Range Status   2025 294 (H) 0 - 250 U/L Final     Fibrinogen Activity   Date Value Ref Range Status   2025 484 170 - 510 mg/dL Final     Creatinine   Date Value Ref Range Status   2025 0.55 0.51 - 0.95 mg/dL Final          Intake/Output Summary (Last 24 hours) at 2025 1447  Last data filed at 2025 0914  Gross per 24 hour   Intake 100 ml   Output --   Net 100 ml     A/P: 35 yo  at 30w1d, CHTN with superimposed preeclampsia with severe features  - continue magnesium through steroid course  - 2nd betamethasone dose due tomorrow morning  - Per MFM, labs q 8 hours for now; if labs remain stable after steroids are completed, consider discontinuing magnesium and spacing out to daily labs.   - Continue PO antihypertensives  - NICU consult placed  - Pt is considering elective c/s vs induction when delivery has to occur. Aware of risks/benefits and will have another discussion when that time comes.   - Partner is overseas for a work meeting. " Discussed options and recommend having him fly home at this time and pt is in agreement.     Magalie Lovell MD

## 2025-02-13 NOTE — H&P
OB/GYN History and Physical    CC: Elevated blood pressure    HPI: Sarah Parada is a 34 year old  at 30w0d who presented for elevated blood pressures. She is asymptomatic except for bilateral lower extremity edema.     Pt's pregnancy is significant for:  CHTN - on labetalol 400mg BID  AMA  IVF pregnancy  Obesity BMI  42.9  Possibly secondary hypothyroidism  Marginal cord insertion    OB History:   OB History    Para Term  AB Living   1 0 0 0 0 0   SAB IAB Ectopic Multiple Live Births   0 0 0 0 0      # Outcome Date GA Lbr Neil/2nd Weight Sex Type Anes PTL Lv   1 Current                PMHx:   Past Medical History:   Diagnosis Date    Hypertension     Uterine polyp        PSHx:   Past Surgical History:   Procedure Laterality Date    APPENDECTOMY  2020    DILATION AND CURETTAGE, OPERATIVE HYSTEROSCOPY WITH MORCELLATOR, COMBINED N/A 2024    Procedure: HYSTEROSCOPY DILATION AND CURETTAGE WITH MYOSURE;  Surgeon: Nieves Smith MD;  Location: [facility] OR    LAPAROSCOPY  2021    LAPAROSCOPY  2020       FHx:   History reviewed. No pertinent family history.    SHx:   Social History     Socioeconomic History    Marital status:      Spouse name: None    Number of children: None    Years of education: None    Highest education level: None   Tobacco Use    Smoking status: Never    Smokeless tobacco: Never   Substance and Sexual Activity    Alcohol use: Not Currently     Comment: occ    Drug use: Never       Medications:   Current Facility-Administered Medications   Medication Dose Route Frequency Provider Last Rate Last Admin    lidocaine (LMX4) cream   Topical Q1H PRN Betzaida Ham APRN CNMATEO        lidocaine 1 % 0.1-1 mL  0.1-1 mL Other Q1H PRN Betzaida Ham APRN CNM        sodium chloride (PF) 0.9% PF flush 3 mL  3 mL Intracatheter Q8H Betzaida Ham APRN CNM        sodium chloride (PF) 0.9% PF flush 3 mL  3 mL Intracatheter q1 min prn Jitendra  "MARCELL Huston CNM            No Known Allergies    Physical Exam:  BP (!) 168/91   Resp 18   Ht 1.626 m (5' 4\")   Wt 113.4 kg (250 lb)   BMI 42.91 kg/m    General: well-developed, well-nourished female in NAD  HEENT: NCAT, moist mucus membranes  Heart: acyanotic  Lungs: non-labored breathing  Abdomen: gravid, nontender  Extremities: no clubbing, cyanosis, or edema. No calf tenderness.    NST: FHR: 14, mod variability, +accels, nodecels. Reactive NST  Shallowater: Quiet    Assessment/Plan: 34 year old  at 30w0d with evaluation for CHTN with superimposed preeclampsia with SF    1. Fetal status reassuring:    - NST reactive for gestational age   -Growth US with MFM : EFW 35%, AC 38%   -Repeat growth due on  if still inpatient   -Continuous monitoring for now. Will switch to NST qshift with BPP twice weekly if admitted   2. Prenatal labs   - Blood type O+   - Rubella Immune   - GBS unknown  3. CHTN   -One severe range elevation upon evaluation in triage   -CBC and CMP wnl    -Current regimen labetalol 400mg TID. Will initiate procardia XL 30mg now   -Will initiate magnesium sulfate with persistently elevated severe range blood pressures or labs that meet criteria for severe features   -If SF diagnosed will give BTMZ as well   -Plan to observe at a minimum overnight. Get MFM consultation. May need admission until delivery     4. AMA  - low risk NIPT  5. Obesity BMI 42 - lovenox PP  6. IVF pregnancy    Tracey Ch DO  [facility]  "

## 2025-02-13 NOTE — PLAN OF CARE
"  Problem: Adult Inpatient Plan of Care  Goal: Patient-Specific Goal (Individualized)  Description: You can add care plan individualizations to a care plan. Examples of Individualization might be:  \"Parent requests to be called daily at 9am for status\", \"I have a hard time hearing out of my right ear\", or \"Do not touch me to wake me up as it startles  me\".  Outcome: Progressing  Flowsheets (Taken 2/12/2025 2016)  Anxieties, Fears or Concerns: Pt will communicate questions, concerns and/or changes     Problem: Adult Inpatient Plan of Care  Goal: Optimal Comfort and Wellbeing  Outcome: Progressing  Intervention: Provide Person-Centered Care  Recent Flowsheet Documentation  Taken 2/12/2025 1950 by Brittney Ramey RN  Trust Relationship/Rapport:   care explained   choices provided   emotional support provided   empathic listening provided   questions answered   questions encouraged   reassurance provided   thoughts/feelings acknowledged     Problem: Hypertensive Disorders in Pregnancy  Goal: Patient-Fetal Stabilization  Outcome: Progressing  Intervention: Optimize Blood Pressure and Fluid Status  Recent Flowsheet Documentation  Taken 2/12/2025 2120 by Brittney Ramey RN  Fetal Wellbeing Promotion:   fetal heart rate monitored   uterine contraction activity assessed  Fluid/Electrolyte Management: fluids provided   Goal Outcome Evaluation:      Plan of Care Reviewed With: patient    Overall Patient Progress: improvingOverall Patient Progress: improving     VSS, , reactive, appropriate for gestational age. Pt denies pain, NAD. Stable neuro status.      "

## 2025-02-14 LAB
ALBUMIN MFR UR ELPH: 50.3 MG/DL
ALBUMIN SERPL BCG-MCNC: 3.3 G/DL (ref 3.5–5.2)
ALBUMIN SERPL BCG-MCNC: 3.4 G/DL (ref 3.5–5.2)
ALP SERPL-CCNC: 117 U/L (ref 40–150)
ALP SERPL-CCNC: 123 U/L (ref 40–150)
ALT SERPL W P-5'-P-CCNC: 88 U/L (ref 0–50)
ALT SERPL W P-5'-P-CCNC: 93 U/L (ref 0–50)
ANION GAP SERPL CALCULATED.3IONS-SCNC: 13 MMOL/L (ref 7–15)
ANION GAP SERPL CALCULATED.3IONS-SCNC: 13 MMOL/L (ref 7–15)
APTT PPP: 24 SECONDS (ref 22–38)
APTT PPP: 25 SECONDS (ref 22–38)
AST SERPL W P-5'-P-CCNC: 67 U/L (ref 0–45)
AST SERPL W P-5'-P-CCNC: ABNORMAL U/L
BASOPHILS # BLD AUTO: 0 10E3/UL (ref 0–0.2)
BASOPHILS NFR BLD AUTO: 0 %
BILIRUB SERPL-MCNC: 0.2 MG/DL
BILIRUB SERPL-MCNC: 0.3 MG/DL
BUN SERPL-MCNC: 12 MG/DL (ref 6–20)
BUN SERPL-MCNC: 13.2 MG/DL (ref 6–20)
CALCIUM SERPL-MCNC: 8 MG/DL (ref 8.8–10.4)
CALCIUM SERPL-MCNC: 8.3 MG/DL (ref 8.8–10.4)
CHLORIDE SERPL-SCNC: 100 MMOL/L (ref 98–107)
CHLORIDE SERPL-SCNC: 102 MMOL/L (ref 98–107)
CREAT SERPL-MCNC: 0.58 MG/DL (ref 0.51–0.95)
CREAT SERPL-MCNC: 0.59 MG/DL (ref 0.51–0.95)
CREAT UR-MCNC: 93.5 MG/DL
EGFRCR SERPLBLD CKD-EPI 2021: >90 ML/MIN/1.73M2
EGFRCR SERPLBLD CKD-EPI 2021: >90 ML/MIN/1.73M2
EOSINOPHIL # BLD AUTO: 0 10E3/UL (ref 0–0.7)
EOSINOPHIL NFR BLD AUTO: 0 %
ERYTHROCYTE [DISTWIDTH] IN BLOOD BY AUTOMATED COUNT: 14.6 % (ref 10–15)
ERYTHROCYTE [DISTWIDTH] IN BLOOD BY AUTOMATED COUNT: 14.6 % (ref 10–15)
FIBRINOGEN PPP-MCNC: 469 MG/DL (ref 170–510)
FIBRINOGEN PPP-MCNC: 474 MG/DL (ref 170–510)
GLUCOSE BLDC GLUCOMTR-MCNC: 142 MG/DL (ref 70–99)
GLUCOSE SERPL-MCNC: 118 MG/DL (ref 70–99)
GLUCOSE SERPL-MCNC: 129 MG/DL (ref 70–99)
GP B STREP DNA SPEC QL NAA+PROBE: NEGATIVE
HCO3 SERPL-SCNC: 20 MMOL/L (ref 22–29)
HCO3 SERPL-SCNC: 21 MMOL/L (ref 22–29)
HCT VFR BLD AUTO: 37.7 % (ref 35–47)
HCT VFR BLD AUTO: 40.5 % (ref 35–47)
HGB BLD-MCNC: 12.8 G/DL (ref 11.7–15.7)
HGB BLD-MCNC: 13.5 G/DL (ref 11.7–15.7)
HOLD SPECIMEN: NORMAL
IMM GRANULOCYTES # BLD: 0.1 10E3/UL
IMM GRANULOCYTES NFR BLD: 1 %
INR PPP: 0.89 (ref 0.85–1.15)
INR PPP: 0.96 (ref 0.85–1.15)
LDH SERPL L TO P-CCNC: 223 U/L (ref 0–250)
LYMPHOCYTES # BLD AUTO: 1.4 10E3/UL (ref 0.8–5.3)
LYMPHOCYTES NFR BLD AUTO: 12 %
MAGNESIUM SERPL-MCNC: 5.9 MG/DL (ref 1.7–2.3)
MCH RBC QN AUTO: 30.5 PG (ref 26.5–33)
MCH RBC QN AUTO: 30.6 PG (ref 26.5–33)
MCHC RBC AUTO-ENTMCNC: 33.3 G/DL (ref 31.5–36.5)
MCHC RBC AUTO-ENTMCNC: 34 G/DL (ref 31.5–36.5)
MCV RBC AUTO: 90 FL (ref 78–100)
MCV RBC AUTO: 92 FL (ref 78–100)
MONOCYTES # BLD AUTO: 0.6 10E3/UL (ref 0–1.3)
MONOCYTES NFR BLD AUTO: 5 %
NEUTROPHILS # BLD AUTO: 10.1 10E3/UL (ref 1.6–8.3)
NEUTROPHILS NFR BLD AUTO: 83 %
NRBC # BLD AUTO: 0 10E3/UL
NRBC BLD AUTO-RTO: 0 /100
PLATELET # BLD AUTO: 177 10E3/UL (ref 150–450)
PLATELET # BLD AUTO: 185 10E3/UL (ref 150–450)
POTASSIUM SERPL-SCNC: 4.2 MMOL/L (ref 3.4–5.3)
POTASSIUM SERPL-SCNC: 5.1 MMOL/L (ref 3.4–5.3)
PROT SERPL-MCNC: 6.2 G/DL (ref 6.4–8.3)
PROT SERPL-MCNC: 6.3 G/DL (ref 6.4–8.3)
PROT/CREAT 24H UR: 0.54 MG/MG CR (ref 0–0.2)
RBC # BLD AUTO: 4.18 10E6/UL (ref 3.8–5.2)
RBC # BLD AUTO: 4.42 10E6/UL (ref 3.8–5.2)
SODIUM SERPL-SCNC: 133 MMOL/L (ref 135–145)
SODIUM SERPL-SCNC: 136 MMOL/L (ref 135–145)
WBC # BLD AUTO: 12.3 10E3/UL (ref 4–11)
WBC # BLD AUTO: 12.9 10E3/UL (ref 4–11)

## 2025-02-14 PROCEDURE — 83615 LACTATE (LD) (LDH) ENZYME: CPT | Performed by: OBSTETRICS & GYNECOLOGY

## 2025-02-14 PROCEDURE — 80053 COMPREHEN METABOLIC PANEL: CPT | Performed by: OBSTETRICS & GYNECOLOGY

## 2025-02-14 PROCEDURE — 84155 ASSAY OF PROTEIN SERUM: CPT | Performed by: OBSTETRICS & GYNECOLOGY

## 2025-02-14 PROCEDURE — 99231 SBSQ HOSP IP/OBS SF/LOW 25: CPT | Performed by: NURSE PRACTITIONER

## 2025-02-14 PROCEDURE — 272N000001 HC OR GENERAL SUPPLY STERILE: Performed by: OBSTETRICS & GYNECOLOGY

## 2025-02-14 PROCEDURE — 370N000017 HC ANESTHESIA TECHNICAL FEE, PER MIN: Performed by: OBSTETRICS & GYNECOLOGY

## 2025-02-14 PROCEDURE — 85014 HEMATOCRIT: CPT | Performed by: OBSTETRICS & GYNECOLOGY

## 2025-02-14 PROCEDURE — 258N000003 HC RX IP 258 OP 636: Performed by: OBSTETRICS & GYNECOLOGY

## 2025-02-14 PROCEDURE — 250N000011 HC RX IP 250 OP 636: Performed by: OBSTETRICS & GYNECOLOGY

## 2025-02-14 PROCEDURE — 250N000013 HC RX MED GY IP 250 OP 250 PS 637: Performed by: OBSTETRICS & GYNECOLOGY

## 2025-02-14 PROCEDURE — 85730 THROMBOPLASTIN TIME PARTIAL: CPT | Performed by: OBSTETRICS & GYNECOLOGY

## 2025-02-14 PROCEDURE — 999N000248 HC STATISTIC IV INSERT WITH US BY RN

## 2025-02-14 PROCEDURE — 82310 ASSAY OF CALCIUM: CPT | Performed by: OBSTETRICS & GYNECOLOGY

## 2025-02-14 PROCEDURE — 85610 PROTHROMBIN TIME: CPT | Performed by: OBSTETRICS & GYNECOLOGY

## 2025-02-14 PROCEDURE — 82247 BILIRUBIN TOTAL: CPT | Performed by: OBSTETRICS & GYNECOLOGY

## 2025-02-14 PROCEDURE — 999N000285 HC STATISTIC VASC ACCESS LAB DRAW WITH PIV START

## 2025-02-14 PROCEDURE — 360N000076 HC SURGERY LEVEL 3, PER MIN: Performed by: OBSTETRICS & GYNECOLOGY

## 2025-02-14 PROCEDURE — 88307 TISSUE EXAM BY PATHOLOGIST: CPT | Mod: TC | Performed by: OBSTETRICS & GYNECOLOGY

## 2025-02-14 PROCEDURE — 85025 COMPLETE CBC W/AUTO DIFF WBC: CPT | Performed by: OBSTETRICS & GYNECOLOGY

## 2025-02-14 PROCEDURE — 36415 COLL VENOUS BLD VENIPUNCTURE: CPT | Performed by: OBSTETRICS & GYNECOLOGY

## 2025-02-14 PROCEDURE — 84156 ASSAY OF PROTEIN URINE: CPT | Performed by: OBSTETRICS & GYNECOLOGY

## 2025-02-14 PROCEDURE — 999N000249 HC STATISTIC C-SECTION ON UNIT

## 2025-02-14 PROCEDURE — 250N000013 HC RX MED GY IP 250 OP 250 PS 637

## 2025-02-14 PROCEDURE — 710N000010 HC RECOVERY PHASE 1, LEVEL 2, PER MIN: Performed by: OBSTETRICS & GYNECOLOGY

## 2025-02-14 PROCEDURE — 85384 FIBRINOGEN ACTIVITY: CPT | Performed by: OBSTETRICS & GYNECOLOGY

## 2025-02-14 PROCEDURE — 120N000013 HC R&B IMCU

## 2025-02-14 PROCEDURE — 83735 ASSAY OF MAGNESIUM: CPT | Performed by: OBSTETRICS & GYNECOLOGY

## 2025-02-14 RX ORDER — KETOROLAC TROMETHAMINE 30 MG/ML
15 INJECTION, SOLUTION INTRAMUSCULAR; INTRAVENOUS EVERY 6 HOURS
Status: COMPLETED | OUTPATIENT
Start: 2025-02-14 | End: 2025-02-15

## 2025-02-14 RX ORDER — MAGNESIUM SULFATE IN WATER 40 MG/ML
1 INJECTION, SOLUTION INTRAVENOUS CONTINUOUS
Status: DISCONTINUED | OUTPATIENT
Start: 2025-02-14 | End: 2025-02-17 | Stop reason: HOSPADM

## 2025-02-14 RX ORDER — OXYTOCIN 10 [USP'U]/ML
10 INJECTION, SOLUTION INTRAMUSCULAR; INTRAVENOUS
Status: DISCONTINUED | OUTPATIENT
Start: 2025-02-14 | End: 2025-02-14 | Stop reason: HOSPADM

## 2025-02-14 RX ORDER — HYDROCORTISONE 25 MG/G
CREAM TOPICAL 3 TIMES DAILY PRN
Status: DISCONTINUED | OUTPATIENT
Start: 2025-02-14 | End: 2025-02-17 | Stop reason: HOSPADM

## 2025-02-14 RX ORDER — SODIUM PHOSPHATE,MONO-DIBASIC 19G-7G/118
1 ENEMA (ML) RECTAL DAILY PRN
Status: DISCONTINUED | OUTPATIENT
Start: 2025-02-16 | End: 2025-02-17 | Stop reason: HOSPADM

## 2025-02-14 RX ORDER — OXYTOCIN/0.9 % SODIUM CHLORIDE 30/500 ML
100-340 PLASTIC BAG, INJECTION (ML) INTRAVENOUS CONTINUOUS PRN
Status: DISCONTINUED | OUTPATIENT
Start: 2025-02-14 | End: 2025-02-17 | Stop reason: HOSPADM

## 2025-02-14 RX ORDER — HYDRALAZINE HYDROCHLORIDE 20 MG/ML
5-10 INJECTION INTRAMUSCULAR; INTRAVENOUS
Status: DISCONTINUED | OUTPATIENT
Start: 2025-02-14 | End: 2025-02-17 | Stop reason: HOSPADM

## 2025-02-14 RX ORDER — LABETALOL HYDROCHLORIDE 5 MG/ML
20-40 INJECTION, SOLUTION INTRAVENOUS EVERY 10 MIN PRN
Status: DISCONTINUED | OUTPATIENT
Start: 2025-02-14 | End: 2025-02-17 | Stop reason: HOSPADM

## 2025-02-14 RX ORDER — LIDOCAINE 40 MG/G
CREAM TOPICAL
Status: DISCONTINUED | OUTPATIENT
Start: 2025-02-14 | End: 2025-02-17 | Stop reason: HOSPADM

## 2025-02-14 RX ORDER — OXYCODONE HYDROCHLORIDE 5 MG/1
5 TABLET ORAL EVERY 4 HOURS PRN
Status: DISCONTINUED | OUTPATIENT
Start: 2025-02-14 | End: 2025-02-17 | Stop reason: HOSPADM

## 2025-02-14 RX ORDER — NALOXONE HYDROCHLORIDE 0.4 MG/ML
0.4 INJECTION, SOLUTION INTRAMUSCULAR; INTRAVENOUS; SUBCUTANEOUS
Status: DISCONTINUED | OUTPATIENT
Start: 2025-02-14 | End: 2025-02-17 | Stop reason: HOSPADM

## 2025-02-14 RX ORDER — IBUPROFEN 800 MG/1
800 TABLET, FILM COATED ORAL EVERY 6 HOURS
Status: DISCONTINUED | OUTPATIENT
Start: 2025-02-15 | End: 2025-02-17 | Stop reason: HOSPADM

## 2025-02-14 RX ORDER — BISACODYL 10 MG
10 SUPPOSITORY, RECTAL RECTAL DAILY PRN
Status: DISCONTINUED | OUTPATIENT
Start: 2025-02-16 | End: 2025-02-17 | Stop reason: HOSPADM

## 2025-02-14 RX ORDER — NALOXONE HYDROCHLORIDE 0.4 MG/ML
0.2 INJECTION, SOLUTION INTRAMUSCULAR; INTRAVENOUS; SUBCUTANEOUS
Status: DISCONTINUED | OUTPATIENT
Start: 2025-02-14 | End: 2025-02-17 | Stop reason: HOSPADM

## 2025-02-14 RX ORDER — LOPERAMIDE HYDROCHLORIDE 2 MG/1
4 CAPSULE ORAL
Status: DISCONTINUED | OUTPATIENT
Start: 2025-02-14 | End: 2025-02-17 | Stop reason: HOSPADM

## 2025-02-14 RX ORDER — MAGNESIUM SULFATE 4 G/50ML
4 INJECTION INTRAVENOUS
Status: DISCONTINUED | OUTPATIENT
Start: 2025-02-14 | End: 2025-02-17 | Stop reason: HOSPADM

## 2025-02-14 RX ORDER — LIDOCAINE 40 MG/G
CREAM TOPICAL
Status: DISCONTINUED | OUTPATIENT
Start: 2025-02-14 | End: 2025-02-14 | Stop reason: HOSPADM

## 2025-02-14 RX ORDER — LOPERAMIDE HYDROCHLORIDE 2 MG/1
4 CAPSULE ORAL
Status: DISCONTINUED | OUTPATIENT
Start: 2025-02-14 | End: 2025-02-14 | Stop reason: HOSPADM

## 2025-02-14 RX ORDER — ENOXAPARIN SODIUM 100 MG/ML
40 INJECTION SUBCUTANEOUS EVERY 12 HOURS
Status: DISCONTINUED | OUTPATIENT
Start: 2025-02-15 | End: 2025-02-17 | Stop reason: HOSPADM

## 2025-02-14 RX ORDER — MISOPROSTOL 200 UG/1
800 TABLET ORAL
Status: DISCONTINUED | OUTPATIENT
Start: 2025-02-14 | End: 2025-02-17 | Stop reason: HOSPADM

## 2025-02-14 RX ORDER — METHYLERGONOVINE MALEATE 0.2 MG/ML
200 INJECTION INTRAVENOUS
Status: DISCONTINUED | OUTPATIENT
Start: 2025-02-14 | End: 2025-02-14 | Stop reason: HOSPADM

## 2025-02-14 RX ORDER — TRANEXAMIC ACID 10 MG/ML
1 INJECTION, SOLUTION INTRAVENOUS EVERY 30 MIN PRN
Status: DISCONTINUED | OUTPATIENT
Start: 2025-02-14 | End: 2025-02-17 | Stop reason: HOSPADM

## 2025-02-14 RX ORDER — CITRIC ACID/SODIUM CITRATE 334-500MG
30 SOLUTION, ORAL ORAL
Status: COMPLETED | OUTPATIENT
Start: 2025-02-14 | End: 2025-02-14

## 2025-02-14 RX ORDER — MAGNESIUM SULFATE HEPTAHYDRATE 40 MG/ML
2 INJECTION, SOLUTION INTRAVENOUS
Status: DISCONTINUED | OUTPATIENT
Start: 2025-02-14 | End: 2025-02-17 | Stop reason: HOSPADM

## 2025-02-14 RX ORDER — MISOPROSTOL 200 UG/1
400 TABLET ORAL
Status: DISCONTINUED | OUTPATIENT
Start: 2025-02-14 | End: 2025-02-14 | Stop reason: HOSPADM

## 2025-02-14 RX ORDER — METHYLERGONOVINE MALEATE 0.2 MG/ML
200 INJECTION INTRAVENOUS
Status: DISCONTINUED | OUTPATIENT
Start: 2025-02-14 | End: 2025-02-17 | Stop reason: HOSPADM

## 2025-02-14 RX ORDER — KETOROLAC TROMETHAMINE 30 MG/ML
15 INJECTION, SOLUTION INTRAMUSCULAR; INTRAVENOUS EVERY 6 HOURS
Status: DISCONTINUED | OUTPATIENT
Start: 2025-02-15 | End: 2025-02-14

## 2025-02-14 RX ORDER — ACETAMINOPHEN 325 MG/1
975 TABLET ORAL ONCE
Status: COMPLETED | OUTPATIENT
Start: 2025-02-14 | End: 2025-02-14

## 2025-02-14 RX ORDER — CALCIUM GLUCONATE 94 MG/ML
1 INJECTION, SOLUTION INTRAVENOUS
Status: DISCONTINUED | OUTPATIENT
Start: 2025-02-14 | End: 2025-02-17 | Stop reason: HOSPADM

## 2025-02-14 RX ORDER — ONDANSETRON 2 MG/ML
4 INJECTION INTRAMUSCULAR; INTRAVENOUS EVERY 6 HOURS PRN
Status: DISCONTINUED | OUTPATIENT
Start: 2025-02-14 | End: 2025-02-14

## 2025-02-14 RX ORDER — CEFAZOLIN SODIUM/WATER 2 G/20 ML
2 SYRINGE (ML) INTRAVENOUS
Status: COMPLETED | OUTPATIENT
Start: 2025-02-14 | End: 2025-02-14

## 2025-02-14 RX ORDER — SODIUM CHLORIDE, SODIUM LACTATE, POTASSIUM CHLORIDE, CALCIUM CHLORIDE 600; 310; 30; 20 MG/100ML; MG/100ML; MG/100ML; MG/100ML
INJECTION, SOLUTION INTRAVENOUS CONTINUOUS
Status: DISCONTINUED | OUTPATIENT
Start: 2025-02-14 | End: 2025-02-14 | Stop reason: HOSPADM

## 2025-02-14 RX ORDER — LOPERAMIDE HYDROCHLORIDE 2 MG/1
2 CAPSULE ORAL
Status: DISCONTINUED | OUTPATIENT
Start: 2025-02-14 | End: 2025-02-14 | Stop reason: HOSPADM

## 2025-02-14 RX ORDER — OXYTOCIN/0.9 % SODIUM CHLORIDE 30/500 ML
340 PLASTIC BAG, INJECTION (ML) INTRAVENOUS CONTINUOUS PRN
Status: DISCONTINUED | OUTPATIENT
Start: 2025-02-14 | End: 2025-02-17 | Stop reason: HOSPADM

## 2025-02-14 RX ORDER — MISOPROSTOL 200 UG/1
400 TABLET ORAL
Status: DISCONTINUED | OUTPATIENT
Start: 2025-02-14 | End: 2025-02-17 | Stop reason: HOSPADM

## 2025-02-14 RX ORDER — DEXTROSE MONOHYDRATE 100 MG/ML
INJECTION, SOLUTION INTRAVENOUS CONTINUOUS
Status: CANCELLED | OUTPATIENT
Start: 2025-02-14

## 2025-02-14 RX ORDER — CARBOPROST TROMETHAMINE 250 UG/ML
250 INJECTION, SOLUTION INTRAMUSCULAR
Status: DISCONTINUED | OUTPATIENT
Start: 2025-02-14 | End: 2025-02-17 | Stop reason: HOSPADM

## 2025-02-14 RX ORDER — AMOXICILLIN 250 MG
2 CAPSULE ORAL 2 TIMES DAILY
Status: DISCONTINUED | OUTPATIENT
Start: 2025-02-14 | End: 2025-02-17 | Stop reason: HOSPADM

## 2025-02-14 RX ORDER — OXYTOCIN 10 [USP'U]/ML
10 INJECTION, SOLUTION INTRAMUSCULAR; INTRAVENOUS
Status: DISCONTINUED | OUTPATIENT
Start: 2025-02-14 | End: 2025-02-17 | Stop reason: HOSPADM

## 2025-02-14 RX ORDER — ERYTHROMYCIN 5 MG/G
OINTMENT OPHTHALMIC ONCE
Status: CANCELLED | OUTPATIENT
Start: 2025-02-14 | End: 2025-02-14

## 2025-02-14 RX ORDER — LOPERAMIDE HYDROCHLORIDE 2 MG/1
2 CAPSULE ORAL
Status: DISCONTINUED | OUTPATIENT
Start: 2025-02-14 | End: 2025-02-17 | Stop reason: HOSPADM

## 2025-02-14 RX ORDER — LIDOCAINE 40 MG/G
CREAM TOPICAL
Status: DISCONTINUED | OUTPATIENT
Start: 2025-02-14 | End: 2025-02-14

## 2025-02-14 RX ORDER — CEFAZOLIN SODIUM/WATER 2 G/20 ML
2 SYRINGE (ML) INTRAVENOUS SEE ADMIN INSTRUCTIONS
Status: DISCONTINUED | OUTPATIENT
Start: 2025-02-14 | End: 2025-02-14 | Stop reason: HOSPADM

## 2025-02-14 RX ORDER — OXYTOCIN/0.9 % SODIUM CHLORIDE 30/500 ML
340 PLASTIC BAG, INJECTION (ML) INTRAVENOUS CONTINUOUS PRN
Status: DISCONTINUED | OUTPATIENT
Start: 2025-02-14 | End: 2025-02-14 | Stop reason: HOSPADM

## 2025-02-14 RX ORDER — DEXTROSE, SODIUM CHLORIDE, SODIUM LACTATE, POTASSIUM CHLORIDE, AND CALCIUM CHLORIDE 5; .6; .31; .03; .02 G/100ML; G/100ML; G/100ML; G/100ML; G/100ML
INJECTION, SOLUTION INTRAVENOUS CONTINUOUS
Status: DISCONTINUED | OUTPATIENT
Start: 2025-02-14 | End: 2025-02-17 | Stop reason: HOSPADM

## 2025-02-14 RX ORDER — CARBOPROST TROMETHAMINE 250 UG/ML
250 INJECTION, SOLUTION INTRAMUSCULAR
Status: DISCONTINUED | OUTPATIENT
Start: 2025-02-14 | End: 2025-02-14 | Stop reason: HOSPADM

## 2025-02-14 RX ORDER — LABETALOL 200 MG/1
400 TABLET, FILM COATED ORAL EVERY 12 HOURS SCHEDULED
Status: DISCONTINUED | OUTPATIENT
Start: 2025-02-14 | End: 2025-02-16

## 2025-02-14 RX ORDER — SODIUM CHLORIDE, SODIUM LACTATE, POTASSIUM CHLORIDE, CALCIUM CHLORIDE 600; 310; 30; 20 MG/100ML; MG/100ML; MG/100ML; MG/100ML
10-125 INJECTION, SOLUTION INTRAVENOUS CONTINUOUS
Status: DISCONTINUED | OUTPATIENT
Start: 2025-02-14 | End: 2025-02-17 | Stop reason: HOSPADM

## 2025-02-14 RX ORDER — ACETAMINOPHEN 325 MG/1
975 TABLET ORAL EVERY 6 HOURS
Status: DISCONTINUED | OUTPATIENT
Start: 2025-02-14 | End: 2025-02-17 | Stop reason: HOSPADM

## 2025-02-14 RX ORDER — NIFEDIPINE 30 MG
30 TABLET, EXTENDED RELEASE ORAL DAILY
Status: DISCONTINUED | OUTPATIENT
Start: 2025-02-15 | End: 2025-02-16

## 2025-02-14 RX ORDER — MISOPROSTOL 200 UG/1
800 TABLET ORAL
Status: DISCONTINUED | OUTPATIENT
Start: 2025-02-14 | End: 2025-02-14 | Stop reason: HOSPADM

## 2025-02-14 RX ORDER — TRANEXAMIC ACID 10 MG/ML
1 INJECTION, SOLUTION INTRAVENOUS EVERY 30 MIN PRN
Status: DISCONTINUED | OUTPATIENT
Start: 2025-02-14 | End: 2025-02-14 | Stop reason: HOSPADM

## 2025-02-14 RX ORDER — FLUCONAZOLE 2 MG/ML
6 INJECTION INTRAVENOUS
Status: CANCELLED | OUTPATIENT
Start: 2025-02-14

## 2025-02-14 RX ORDER — SIMETHICONE 80 MG
80 TABLET,CHEWABLE ORAL 4 TIMES DAILY PRN
Status: DISCONTINUED | OUTPATIENT
Start: 2025-02-14 | End: 2025-02-17 | Stop reason: HOSPADM

## 2025-02-14 RX ORDER — AMOXICILLIN 250 MG
1 CAPSULE ORAL 2 TIMES DAILY
Status: DISCONTINUED | OUTPATIENT
Start: 2025-02-14 | End: 2025-02-17 | Stop reason: HOSPADM

## 2025-02-14 RX ADMIN — KETOROLAC TROMETHAMINE 15 MG: 30 INJECTION, SOLUTION INTRAMUSCULAR at 22:46

## 2025-02-14 RX ADMIN — SODIUM CITRATE AND CITRIC ACID MONOHYDRATE 30 ML: 500; 334 SOLUTION ORAL at 15:31

## 2025-02-14 RX ADMIN — SODIUM CHLORIDE, POTASSIUM CHLORIDE, SODIUM LACTATE AND CALCIUM CHLORIDE 75 ML/HR: 600; 310; 30; 20 INJECTION, SOLUTION INTRAVENOUS at 10:52

## 2025-02-14 RX ADMIN — FAMOTIDINE 20 MG: 20 TABLET, FILM COATED ORAL at 09:34

## 2025-02-14 RX ADMIN — DOCUSATE SODIUM 100 MG: 100 CAPSULE, LIQUID FILLED ORAL at 08:58

## 2025-02-14 RX ADMIN — FAMOTIDINE 20 MG: 20 TABLET, FILM COATED ORAL at 22:46

## 2025-02-14 RX ADMIN — SIMETHICONE 80 MG: 80 TABLET, CHEWABLE ORAL at 22:45

## 2025-02-14 RX ADMIN — SENNOSIDES, DOCUSATE SODIUM 1 TABLET: 50; 8.6 TABLET, FILM COATED ORAL at 22:46

## 2025-02-14 RX ADMIN — MAGNESIUM SULFATE HEPTAHYDRATE 2 G/HR: 40 INJECTION, SOLUTION INTRAVENOUS at 05:06

## 2025-02-14 RX ADMIN — ACETAMINOPHEN 975 MG: 325 TABLET ORAL at 22:45

## 2025-02-14 RX ADMIN — NIFEDIPINE 30 MG: 30 TABLET, EXTENDED RELEASE ORAL at 08:58

## 2025-02-14 RX ADMIN — LABETALOL HYDROCHLORIDE 400 MG: 200 TABLET ORAL at 20:09

## 2025-02-14 RX ADMIN — BETAMETHASONE SODIUM PHOSPHATE AND BETAMETHASONE ACETATE 12 MG: 3; 3 INJECTION, SUSPENSION INTRA-ARTICULAR; INTRALESIONAL; INTRAMUSCULAR at 08:59

## 2025-02-14 RX ADMIN — LABETALOL HYDROCHLORIDE 600 MG: 200 TABLET ORAL at 14:34

## 2025-02-14 RX ADMIN — LABETALOL HYDROCHLORIDE 600 MG: 200 TABLET ORAL at 07:03

## 2025-02-14 RX ADMIN — ACETAMINOPHEN 975 MG: 325 TABLET ORAL at 15:30

## 2025-02-14 ASSESSMENT — ACTIVITIES OF DAILY LIVING (ADL)
ADLS_ACUITY_SCORE: 27
ADLS_ACUITY_SCORE: 29
ADLS_ACUITY_SCORE: 27
ADLS_ACUITY_SCORE: 29
ADLS_ACUITY_SCORE: 21
ADLS_ACUITY_SCORE: 21
ADLS_ACUITY_SCORE: 27
ADLS_ACUITY_SCORE: 21
ADLS_ACUITY_SCORE: 21
ADLS_ACUITY_SCORE: 27
ADLS_ACUITY_SCORE: 27
ADLS_ACUITY_SCORE: 21
ADLS_ACUITY_SCORE: 27
ADLS_ACUITY_SCORE: 21
ADLS_ACUITY_SCORE: 29
ADLS_ACUITY_SCORE: 29
ADLS_ACUITY_SCORE: 27
ADLS_ACUITY_SCORE: 27
ADLS_ACUITY_SCORE: 21
ADLS_ACUITY_SCORE: 29
ADLS_ACUITY_SCORE: 27

## 2025-02-14 NOTE — PROGRESS NOTES
"1:47 PM     at 30w2d with history of chronic HTN who was found to have preeclampsia with severe features. Received betamethasone and was started on magnesium. This pregnancy is also an IVF pregnancy.     Called by bedside nurse as patient has been feeling off for past 1-2 hours.     Patient describes epigastric pain, lightheadedness, central forehead pain, and nausea. Went to get up to go to the bathroom and had worsening of lightheadedness. Also notes has been shaky. Denies any chest pain or pressure. Denies any vision changes. No lower abdominal pain or increased swelling in legs. Has noticed some soreness in her thighs. No fevers, chills, or body aches.     BP (!) 142/97   Temp 98.1  F (36.7  C) (Oral)   Resp 16   Ht 1.626 m (5' 4\")   Wt 112.5 kg (248 lb)   SpO2 99%   BMI 42.57 kg/m      Constitutional: awake, alert, pleasant, cooperative  Respiratory: Clear to auscultation bilaterally, no crackles or wheezing  Cardiovascular: Regular rate and rhythm  GI: gravid, mild tenderness to palpation in epigastrium  MSK: 2+ pitting edema up to knees, SCDs in place  Neurologic: Awake, alert, oriented to name, place and time. DTR 2-3+ in upper and lower extremities.     Has been on Mg for past 24 hours. Will check Mg level and repeat preE/HELLP labs. Normal lung exam making flash pulmonary edema less likely. Has had difficulty obtaining labs so PICC team called to help.     Plan:  - obtain labs (PICC team called due to difficulty with lab draws)   - Mg   - CBC   - CMP  - symptomatic management with Tylenol, Reglan, Zofran  - will update primary physician, Dr. Roque    Discussed with Attending Physician, Dr. Reyna.     Erica Garg MD    "

## 2025-02-14 NOTE — PROGRESS NOTES
Dr Roque ordered to proceed with  section. Pt's VSS. Pt has had category one fetal tracing, no contraction. Reports were given to Marisel QUAN

## 2025-02-14 NOTE — PROGRESS NOTES
Called to see Sarah for worsening symptoms since late morning. Started feeling woozy, nauseous, then after was up to the bathroom, had a persistent headache and epigastric pain (that is not her usual heartburn).  Legs tingly and a little numb. Just overall not feeling well.  Which is new since this morning.  Had a hard time getting labs drawn; multiple pokes with no success, had to call PICC team to draw under US guidance.  Some results back: Mg 5.9, hgb 12.8, platelets 185,000, glucose 142, INR 0.96, PTT 24, ALT 88, AST not run due to hemolyzed specimen.  Discussed my concern for worsening of preeclampsia based on her symptoms. Recommended proceeding with delivery by primary CS. She, her parents, and partner (over the phone) are in agreement.  Risks and benefits of surgery discussed; consent obtained to proceed.  Bedside US shows vertex presentation.   Discussed with early gestation, always a possibility of underdeveloped lower uterine segment necessitating classical incision on uterus, and implications of that for future.  She understands.   Anesthesia was in to talk to her about spinal anesthesia.

## 2025-02-14 NOTE — PLAN OF CARE
Problem: Adult Inpatient Plan of Care  Goal: Optimal Comfort and Wellbeing  2/14/2025 0157 by Brittney Ramey RN  Outcome: Progressing  2/13/2025 2233 by Brittney Ramey RN  Outcome: Progressing  Intervention: Provide Person-Centered Care  Recent Flowsheet Documentation  Taken 2/14/2025 0052 by Brittney Ramey RN  Trust Relationship/Rapport:   care explained   choices provided   emotional support provided   empathic listening provided   questions answered   questions encouraged   reassurance provided   thoughts/feelings acknowledged  Taken 2/13/2025 2029 by Brittney Ramey RN  Trust Relationship/Rapport:   care explained   choices provided   emotional support provided   empathic listening provided   questions answered   questions encouraged   reassurance provided   thoughts/feelings acknowledged     Problem: Hypertensive Disorders in Pregnancy  Goal: Patient-Fetal Stabilization  2/14/2025 0157 by Brittney Ramey RN  Outcome: Progressing  2/13/2025 2233 by Brittney Ramey RN  Outcome: Progressing  Intervention: Optimize Blood Pressure and Fluid Status  Recent Flowsheet Documentation  Taken 2/14/2025 0052 by Brittney Ramey RN  Fetal Wellbeing Promotion:   fetal heart rate monitored   intake and output monitored   uterine contraction activity assessed  Fluid/Electrolyte Management: fluids provided  Taken 2/13/2025 2029 by Brittney Ramey RN  Fetal Wellbeing Promotion:   fetal heart rate monitored   intake and output monitored   uterine contraction activity assessed  Fluid/Electrolyte Management: fluids provided  Intervention: Monitor and Manage Symptom Progression  Recent Flowsheet Documentation  Taken 2/14/2025 0052 by Brittney Ramey RN  Seizure Precautions: clutter-free environment maintained  Taken 2/13/2025 2029 by Brittney Ramey RN  Seizure Precautions: clutter-free environment maintained   Goal Outcome Evaluation:      Plan of Care Reviewed With: patient    Overall Patient Progress: improvingOverall Patient  Progress: improving     VSS, reviewed labs with pt from 2150 lab draw and pt informed LFT were improving. Slept well through the night but was woke often due to difficulty tracing infant, easily fell asleep after cares. Mother is present and supportive. Stable, NAD.

## 2025-02-14 NOTE — PROGRESS NOTES
Dr Roque called and was given an update on pt's status ( blood pressures 140/80-90's, brisk reflexes, epigastric pain, nausea, shakiness, urine protein test, blood sugar 140.). Dr Roque was called to come in to evaluate pt and per pt's request.

## 2025-02-14 NOTE — OP NOTE
Section Operative Note      Pre-operative Diagnosis: 1.  Intrauterine pregnancy 30 week 2 days gestation  2.  Chronic hypertension  3.  Superimposed Preeclampsia with severe features, with worsening symptoms of headache and epigastric pain  3.  IVF pregnancy  4.  BMI 42    Post-operative Diagnosis: same, delivered    Procedure:  Primary low segment transverse  section    Surgeon:  Melina Roque M.D.    Assist: Erica Frost, Resident    Anesthesia:  spinal    Estimated Blood Loss:   qbl 836cc    Complications:  None; patient tolerated the procedure well.    Specimens: placenta to pathology    Indications for surgery:  Sarah is a 34yr old  at 30+2wks with chronic hypertension who was admitted 25 at 30+0wks with elevated blood pressures. Diagnosed with superimposed preeclampsia with severe features 25 by elevated LFTs.  Bps and labs stabilized on hospital bedrest, IV magnesium sulfate while receiving  betamethasone. Had second dose BMZ this morning 0900.  Had been feeling well until late morning, when developed lightheadedness and nausea, then a persistent headache and epigastric pain. Legs felt tingly and numb.  Mag level 5.9, glucose normal, platelets normal, LFTs stable. Given the new worsening of symptoms and just feeling unwell, discussed this likely represents worsening of the preeclampsia.  Recommended proceeding with delivery by primary CS now. They agreed. Partner currently overseas ([employer]) for work; has been kept informed of her clinical circumstances throughout her hospital course.   The risks and benefits were discussed and consent obtained to proceed.    Findings:  2#12oz viable male delivered from ROP vertex presentation at 1631hrs. AF clear. No nuchal cord. No spontaneous respirations so quickly taken to NICU team for cares. Bag and mask then intubated. Color improved and some spontaneous respirations. Apgars 0, 7.  Cord gases: 7.28/55/11/26 BE -2.0,  7.32/50// BE -1.6.  Spontaneous delivery of intact placenta with 3VC. Tubes and ovaries normal bilaterally. Lower uterine segment thick but able to do a low segment incision. Mild uterine atony, gradually responded to massage and IV pitocin.      [unfilled]  Procedure Details   The patient was taken to the Operating Room on [facility], identified as Sarah Parada and the procedure verified as  Delivery. A Time Out was held and the above information confirmed.    After administration of spinal anesthesia, the patient was positioned in the left lateral tilt position and was prepped and draped in the usual sterile fashion. A Pfannenstiel incision was made and carried down sharply through the subcutaneous tissue.  The fascia was incised horizontally, and the rectus abdominis muscles bluntly and sharply dissected away from the fascia superiorly and inferiorly to the pubic symphysis.  The rectus muscles were  in the midline, and the peritoneum was identified.  A vertical peritoneal incision was made and extended superiorly and inferiorly to the upper edge of the bladder with stretching.  The vesicouterine peritoneal reflection was down low; it was incised transversely and the bladder flap was bluntly dissected away from the lower uterine segment.     A horizontal incision was made in the thick lower uterine segment; bleeding was encountered from the vascular thick tissue. Uterine cavity entered to the intact amniotic sac. The incision was extended bilaterally in a curvilinear fashion with cephalocaudal traction.  Membranes were ruptured after fundal pressure and elevating the fetal head to the hysterotomy. The amnoitic fluid was  clear.  The infant was delivered from a ROP vertex  presentation at 1631 hrs.  There was no nuchal cord.  Mouth and nares were bulb suctioned and cord was quickly doubly clamped and cut to get him to the NICU team.  The infant was passed off to the Pediatric team in attendance  with findings as noted above.      The placenta was delivered spontaneously, intact, with a 3 vessel cord.  The uterine cavity was wiped free of remaining clot and membrane.  The uterus was exteriorized and the cut edges of the uterine incision were grasped.  There were no extensions of the uterine incision.  The uterus was closed with a double layer of continuous running locked 0 vicryl, the second imbricating the first. One additional figure of X was placed just to the left of midline.  The uterine incision was inspected and all was hemostatic.  The Fallopian tubes and ovaries were examined and appeared normal.  The bladder flap was inspected and was hemostatic.   The uterus was replaced into the abdominal cavity.  The pericolic gutters were swabbed clean.  The uterine incision was once again inspected once back in its normal anatomic position and was hemostatic. Tom powder was applied.  The parietal peritoneum was lying together in the midline. The subfascial space was inspected and hemostasis achieved with electrocautery and one stitch up where the fascia and rectus met on her left.  The fascia was closed with two pieces of continuous running 0 vicryl, starting at each corner and meeting in the midline.  The subcutaneous tissue was irrigated and hemostasis achieved with electrocautery.  The subcu was reapproximated with simple interrupted 3.0 plain.  Skin edges reapproximated with subcuticular 4.0 monocryl.  The incision was dressed with Dermabond and an island dressing.    Estimated blood loss was qbl 836cc.  Sponge and needle counts were correct.  There were no complications.  The patient tolerated the procedure well and was transferred to her recovery room in stable condition. She will remain on her IV MagSO4 for 24hrs, until the preeclampsia is resolving. The infant will be under NICU Nursery status.      Melina Roque M.D.

## 2025-02-14 NOTE — PROGRESS NOTES
Pt continued feeling shaky, light HA, nausea and epigastric pain. Pt refused antinausea medication for now. Lab plebotomists were not able to draw her labs. Residents were called to come evaluate pt.

## 2025-02-14 NOTE — PROCEDURES
PICC team asked to come redraw labs via US. Labs drawn from the left AC using 20# catheter. Blood handed to bedside RN. Pt tolerated well.

## 2025-02-14 NOTE — PROGRESS NOTES
Pt c/o feeling off in the last 30 mins. She also c/o epigastric pain and light HA and shaky. She denied having shortness of breath, chest pain nor chest heaviness. VSS, lungs were clear, reflexes were brisk and no clonus. Dr Roque was notified on her new symptoms within past hour. Orders were given.

## 2025-02-14 NOTE — PLAN OF CARE
Problem: Hypertensive Disorders in Pregnancy  Goal: Patient-Fetal Stabilization  2/13/2025 1825 by Ericka Salazar, RN  Outcome: Progressing  2/13/2025 0936 by Ericka Salazar, RN  Outcome: Progressing   Goal Outcome Evaluation:      Plan of Care Reviewed With: patient    Overall Patient Progress: improvingOverall Patient Progress: improving    Outcome Evaluation: Patients blood pressure had one sever range blood pressure, but subsequent blood pressures out of severe range. She does report a very mild headache after starting MgSO4. Plan to recheck labs at 2200 tonight and repeat betamethasone in the AM.

## 2025-02-14 NOTE — PLAN OF CARE
Problem: Adult Inpatient Plan of Care  Goal: Plan of Care Review  Description: The Plan of Care Review/Shift note should be completed every shift.  The Outcome Evaluation is a brief statement about your assessment that the patient is improving, declining, or no change.  This information will be displayed automatically on your shift  note.  Outcome: Progressing  Flowsheets (Taken 2/13/2025 2233)  Plan of Care Reviewed With: patient  Overall Patient Progress: improving     Problem: Adult Inpatient Plan of Care  Goal: Optimal Comfort and Wellbeing  Outcome: Progressing  Intervention: Provide Person-Centered Care  Recent Flowsheet Documentation  Taken 2/13/2025 2029 by Brittney Ramey RN  Trust Relationship/Rapport:   care explained   choices provided   emotional support provided   empathic listening provided   questions answered   questions encouraged   reassurance provided   thoughts/feelings acknowledged     Problem: Hypertensive Disorders in Pregnancy  Goal: Patient-Fetal Stabilization  Outcome: Progressing  Intervention: Optimize Blood Pressure and Fluid Status  Recent Flowsheet Documentation  Taken 2/13/2025 2029 by Brittney Ramey RN  Fetal Wellbeing Promotion:   fetal heart rate monitored   intake and output monitored   uterine contraction activity assessed  Fluid/Electrolyte Management: fluids provided  Intervention: Monitor and Manage Symptom Progression  Recent Flowsheet Documentation  Taken 2/13/2025 2029 by Brittney Ramey RN  Seizure Precautions: clutter-free environment maintained   Goal Outcome Evaluation:      Plan of Care Reviewed With: patient    Overall Patient Progress: improvingOverall Patient Progress: improving     VSS, , reactive, appropriate for gestational age. Mother is present and supportive, pt is on the phone with her  who is in [place]. Denies pain, stable neuro status.

## 2025-02-14 NOTE — PROGRESS NOTES
"OB ANTEPARTUM PROGRESS NOTE    IUP at 30w2d, admitted for Elevated Blood pressures     SUBJECTIVE:  Patient feels well. She has no new complaints. Patient states the baby is active. She is experiencing no contractions.  Patient denies headache, change in vision or upper abdominal pain.  No leaking or discharge.  Mom is here with her as support; partner is still in [place], working on travel arrangements to fly home. 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 Will have Care Coordinator in clinic email this to her.    OBJECTIVE:  /68   Temp 97.9  F (36.6  C) (Oral)   Resp 16   Ht 1.626 m (5' 4\")   Wt 111.3 kg (245 lb 6.4 oz)   SpO2 100%   BMI 42.12 kg/m     Abd: gravid  NST: baseline 140, moderate variability, 10x10 + accelerations present, decelerations absent  TOCO: no contractions  Gen: NAD  Pulm: CTAB  CV: RRR  Abd: soft, NT, gravid  Extr: 2+ pitting edema bilateral up to knees, DTRs 2+ bilateral LE, no clonus    LABS:  Recent Results (from the past week)   Protein  random urine   Result Value Ref Range Status    Total Protein Urine mg/dL 295.9   mg/dL Final    Total Protein Urine mg/mg Creat 2.38 (H) 0.00 - 0.20 mg/mg Cr Final    Creatinine Urine mg/dL 124.5 mg/dL Final   CBC with platelets   Result Value Ref Range Status    WBC Count 9.9 4.0 - 11.0 10e3/uL Final    RBC Count 4.49 3.80 - 5.20 10e6/uL Final    Hemoglobin 13.5 11.7 - 15.7 g/dL Final    Hematocrit 40.7 35.0 - 47.0 % Final    MCV 91 78 - 100 fL Final    MCH 30.1 26.5 - 33.0 pg Final    MCHC 33.2 31.5 - 36.5 g/dL Final    RDW 14.5 10.0 - 15.0 % Final    Platelet Count 205 150 - 450 10e3/uL Final   Comprehensive metabolic panel   Result Value Ref Range Status    Sodium 139 135 - 145 mmol/L Final    Potassium 4.5 3.4 - 5.3 mmol/L Final    Carbon Dioxide (CO2) 23 22 - 29 mmol/L Final    Anion Gap 11 7 - 15 mmol/L Final    Urea Nitrogen 14.4 6.0 - 20.0 mg/dL Final    Creatinine 0.61 0.51 - " 0.95 mg/dL Final    GFR Estimate >90 >60 mL/min/1.73m2 Final    Calcium 10.0 8.8 - 10.4 mg/dL Final    Chloride 105 98 - 107 mmol/L Final    Glucose 83 70 - 99 mg/dL Final    Alkaline Phosphatase 122 40 - 150 U/L Final    AST 30 0 - 45 U/L Final    ALT 28 0 - 50 U/L Final    Protein Total 5.9 (L) 6.4 - 8.3 g/dL Final    Albumin 3.3 (L) 3.5 - 5.2 g/dL Final    Bilirubin Total <0.2 <=1.2 mg/dL Final     *Note: Due to a large number of results and/or encounters for the requested time period, some results have not been displayed. A complete set of results can be found in Results Review.     Last Comprehensive Metabolic Panel:  Sodium   Date Value Ref Range Status   02/14/2025 136 135 - 145 mmol/L Final     Potassium   Date Value Ref Range Status   02/14/2025 4.2 3.4 - 5.3 mmol/L Final     Chloride   Date Value Ref Range Status   02/14/2025 102 98 - 107 mmol/L Final     Carbon Dioxide (CO2)   Date Value Ref Range Status   02/14/2025 21 (L) 22 - 29 mmol/L Final     Anion Gap   Date Value Ref Range Status   02/14/2025 13 7 - 15 mmol/L Final     Glucose   Date Value Ref Range Status   02/14/2025 118 (H) 70 - 99 mg/dL Final     Urea Nitrogen   Date Value Ref Range Status   02/14/2025 13.2 6.0 - 20.0 mg/dL Final     Creatinine   Date Value Ref Range Status   02/14/2025 0.59 0.51 - 0.95 mg/dL Final     GFR Estimate   Date Value Ref Range Status   02/14/2025 >90 >60 mL/min/1.73m2 Final     Comment:     eGFR calculated using 2021 CKD-EPI equation.     Calcium   Date Value Ref Range Status   02/14/2025 8.3 (L) 8.8 - 10.4 mg/dL Final     Bilirubin Total   Date Value Ref Range Status   02/14/2025 0.3 <=1.2 mg/dL Final     Alkaline Phosphatase   Date Value Ref Range Status   02/14/2025 123 40 - 150 U/L Final     ALT   Date Value Ref Range Status   02/14/2025 93 (H) 0 - 50 U/L Final     AST   Date Value Ref Range Status   02/14/2025 67 (H) 0 - 45 U/L Final     -->162-->93-->67  ALT  140-->144-->114-->93  Platelets stable  177,000    LABS: GBS 25 pending    ASSESSMENT and PLAN:  34 year old  at 30w2d admitted 25 at 30+0wks with elevated BPs in the setting of CHTN. Did require titration of PO medications, but no persistent severe features. Met criteria for superimposed preeclampsia with severe features based on elevation in LFTs     1. Fetal status reassuring:               - NST reactive for gestational age. CEFM now.               -Growth US with MFM : EFW 35%, AC 38%              -Repeat growth in 2 weeks if still undelivered   -Twice a week BPPs with [facility]   -NICU consult today  2. Prenatal labs              - Blood type O+              - Rubella Immune              - GBS pending  3. CHTN              -Titrated to Labetalol 600mg TID  and procardia 30mg XL daily              -Magnesium sulfate starting 6g/2g for NP and severe siPreE. Continue through steriod course, then consider discontinuing Magnesium and trend labs daily   - Second dose Betamethasone at 0845 today   - MFM consulted; appreciate recs  - Trending labs q 8 hours for now     4. AMA  - low risk NIPT  5. Obesity BMI 42 - SCDs now, lovenox PP  6. IVF pregnancy  7. Delivery planning. May opt for delivery by primary CS vs IOL. Will determine at the time delivery is indicated        Melina Roque MD

## 2025-02-15 LAB
ALBUMIN SERPL BCG-MCNC: 3.2 G/DL (ref 3.5–5.2)
ALP SERPL-CCNC: 95 U/L (ref 40–150)
ALT SERPL W P-5'-P-CCNC: 54 U/L (ref 0–50)
ANION GAP SERPL CALCULATED.3IONS-SCNC: 11 MMOL/L (ref 7–15)
AST SERPL W P-5'-P-CCNC: 36 U/L (ref 0–45)
BILIRUB SERPL-MCNC: 0.2 MG/DL
BUN SERPL-MCNC: 16.1 MG/DL (ref 6–20)
CALCIUM SERPL-MCNC: 7.2 MG/DL (ref 8.8–10.4)
CHLORIDE SERPL-SCNC: 101 MMOL/L (ref 98–107)
CREAT SERPL-MCNC: 0.72 MG/DL (ref 0.51–0.95)
EGFRCR SERPLBLD CKD-EPI 2021: >90 ML/MIN/1.73M2
ERYTHROCYTE [DISTWIDTH] IN BLOOD BY AUTOMATED COUNT: 14.8 % (ref 10–15)
GLUCOSE SERPL-MCNC: 107 MG/DL (ref 70–99)
HCO3 SERPL-SCNC: 22 MMOL/L (ref 22–29)
HCT VFR BLD AUTO: 32.4 % (ref 35–47)
HGB BLD-MCNC: 10.6 G/DL (ref 11.7–15.7)
LDH SERPL L TO P-CCNC: 240 U/L (ref 0–250)
MAGNESIUM SERPL-MCNC: 7 MG/DL (ref 1.7–2.3)
MCH RBC QN AUTO: 30.4 PG (ref 26.5–33)
MCHC RBC AUTO-ENTMCNC: 32.7 G/DL (ref 31.5–36.5)
MCV RBC AUTO: 93 FL (ref 78–100)
PLATELET # BLD AUTO: 171 10E3/UL (ref 150–450)
POTASSIUM SERPL-SCNC: 4.7 MMOL/L (ref 3.4–5.3)
PROT SERPL-MCNC: 5.4 G/DL (ref 6.4–8.3)
RBC # BLD AUTO: 3.49 10E6/UL (ref 3.8–5.2)
SODIUM SERPL-SCNC: 134 MMOL/L (ref 135–145)
WBC # BLD AUTO: 13.5 10E3/UL (ref 4–11)

## 2025-02-15 PROCEDURE — 83615 LACTATE (LD) (LDH) ENZYME: CPT | Performed by: OBSTETRICS & GYNECOLOGY

## 2025-02-15 PROCEDURE — 250N000013 HC RX MED GY IP 250 OP 250 PS 637

## 2025-02-15 PROCEDURE — 85048 AUTOMATED LEUKOCYTE COUNT: CPT | Performed by: OBSTETRICS & GYNECOLOGY

## 2025-02-15 PROCEDURE — 85014 HEMATOCRIT: CPT | Performed by: OBSTETRICS & GYNECOLOGY

## 2025-02-15 PROCEDURE — 120N000013 HC R&B IMCU

## 2025-02-15 PROCEDURE — 250N000013 HC RX MED GY IP 250 OP 250 PS 637: Performed by: OBSTETRICS & GYNECOLOGY

## 2025-02-15 PROCEDURE — 250N000011 HC RX IP 250 OP 636: Performed by: OBSTETRICS & GYNECOLOGY

## 2025-02-15 PROCEDURE — 36415 COLL VENOUS BLD VENIPUNCTURE: CPT | Performed by: OBSTETRICS & GYNECOLOGY

## 2025-02-15 PROCEDURE — 80053 COMPREHEN METABOLIC PANEL: CPT | Performed by: OBSTETRICS & GYNECOLOGY

## 2025-02-15 PROCEDURE — 83735 ASSAY OF MAGNESIUM: CPT | Performed by: OBSTETRICS & GYNECOLOGY

## 2025-02-15 RX ORDER — ONDANSETRON 2 MG/ML
4 INJECTION INTRAMUSCULAR; INTRAVENOUS EVERY 6 HOURS PRN
Status: DISCONTINUED | OUTPATIENT
Start: 2025-02-15 | End: 2025-02-17 | Stop reason: HOSPADM

## 2025-02-15 RX ORDER — NALBUPHINE HYDROCHLORIDE 20 MG/ML
10 INJECTION, SOLUTION INTRAMUSCULAR; INTRAVENOUS; SUBCUTANEOUS
Status: DISCONTINUED | OUTPATIENT
Start: 2025-02-15 | End: 2025-02-17 | Stop reason: HOSPADM

## 2025-02-15 RX ADMIN — LABETALOL HYDROCHLORIDE 400 MG: 200 TABLET ORAL at 07:48

## 2025-02-15 RX ADMIN — FAMOTIDINE 20 MG: 20 TABLET, FILM COATED ORAL at 20:56

## 2025-02-15 RX ADMIN — NIFEDIPINE 30 MG: 30 TABLET, EXTENDED RELEASE ORAL at 08:33

## 2025-02-15 RX ADMIN — FAMOTIDINE 20 MG: 20 TABLET, FILM COATED ORAL at 08:33

## 2025-02-15 RX ADMIN — ACETAMINOPHEN 975 MG: 325 TABLET ORAL at 17:42

## 2025-02-15 RX ADMIN — IBUPROFEN 800 MG: 800 TABLET ORAL at 17:42

## 2025-02-15 RX ADMIN — KETOROLAC TROMETHAMINE 15 MG: 30 INJECTION, SOLUTION INTRAMUSCULAR at 11:16

## 2025-02-15 RX ADMIN — SIMETHICONE 80 MG: 80 TABLET, CHEWABLE ORAL at 20:16

## 2025-02-15 RX ADMIN — SENNOSIDES AND DOCUSATE SODIUM 2 TABLET: 50; 8.6 TABLET ORAL at 08:33

## 2025-02-15 RX ADMIN — MAGNESIUM SULFATE HEPTAHYDRATE 2 G/HR: 40 INJECTION, SOLUTION INTRAVENOUS at 00:05

## 2025-02-15 RX ADMIN — KETOROLAC TROMETHAMINE 15 MG: 30 INJECTION, SOLUTION INTRAMUSCULAR at 05:04

## 2025-02-15 RX ADMIN — LABETALOL HYDROCHLORIDE 400 MG: 200 TABLET ORAL at 20:15

## 2025-02-15 RX ADMIN — ACETAMINOPHEN 975 MG: 325 TABLET ORAL at 11:16

## 2025-02-15 RX ADMIN — NALBUPHINE HYDROCHLORIDE 10 MG: 20 INJECTION, SOLUTION INTRAMUSCULAR; INTRAVENOUS; SUBCUTANEOUS at 00:40

## 2025-02-15 RX ADMIN — SENNOSIDES AND DOCUSATE SODIUM 2 TABLET: 50; 8.6 TABLET ORAL at 20:15

## 2025-02-15 RX ADMIN — ACETAMINOPHEN 975 MG: 325 TABLET ORAL at 05:04

## 2025-02-15 RX ADMIN — ONDANSETRON 4 MG: 2 INJECTION INTRAMUSCULAR; INTRAVENOUS at 00:39

## 2025-02-15 RX ADMIN — ENOXAPARIN SODIUM 40 MG: 40 INJECTION SUBCUTANEOUS at 17:42

## 2025-02-15 RX ADMIN — SIMETHICONE 80 MG: 80 TABLET, CHEWABLE ORAL at 03:09

## 2025-02-15 ASSESSMENT — ACTIVITIES OF DAILY LIVING (ADL)
ADLS_ACUITY_SCORE: 36
ADLS_ACUITY_SCORE: 32
ADLS_ACUITY_SCORE: 38
ADLS_ACUITY_SCORE: 32
ADLS_ACUITY_SCORE: 38
ADLS_ACUITY_SCORE: 38
ADLS_ACUITY_SCORE: 30
ADLS_ACUITY_SCORE: 38
ADLS_ACUITY_SCORE: 32
ADLS_ACUITY_SCORE: 38
ADLS_ACUITY_SCORE: 32
ADLS_ACUITY_SCORE: 36
ADLS_ACUITY_SCORE: 38
ADLS_ACUITY_SCORE: 32
ADLS_ACUITY_SCORE: 30
ADLS_ACUITY_SCORE: 32
ADLS_ACUITY_SCORE: 38
ADLS_ACUITY_SCORE: 38

## 2025-02-15 NOTE — PROGRESS NOTES
" Postpartum Day 1    Patient Name:  Sarah Parada  :  1990  MRN:  [id number]      Assessment:  POD 1 from primary LST CS at 30+2wks for superimposed preeclampsia with SF. Stable recovery. Preeclampsia beginning to resolve; labs trending back to normal. Still awaiting large diuresis.    Plan:  Continue current care.  Ok to decrease Magnesium to 1g/hr, then discontinue Magnesium at 1630.    Continue po Labetalol 400mg BID, and Nifedipine 30mg qam.   Start breast pumping to encourage milk to come in.  Discontinue rainey today, continue to monitor I/O.   Plan out of bed and ambulate more after off the Mag  Lovenox BID as DVT prophylaxis    Subjective:  The patient feels well. No headache or epigastric pain, those preeclampsia symptoms resolved after delivery.  Lochia normal, tolerating small amounts normal diet, ambulating without difficulty. Hasn't passed flatus yet.  Pain is well controlled with current medications.  The patient has no emotional concerns.  The baby is doing well in NICU, intubated.  will be here this afternoon.    YOB: 2025  Birth Time: 4:31 PM    Prenatal complications:    cHTN on Labetalol 400mg BID  AMA  IVF pregnancy  BMI 42  Possible secondary hypothyroidism  Marginal cord insertion  Superimposed preeclampsia with SF at 30wks    Objective:  /69 (Patient Position: Semi-Perdomo's, Cuff Size: Adult Regular)   Pulse 77   Temp 97.8  F (36.6  C) (Oral)   Resp 16   Ht 1.626 m (5' 4\")   Wt 112.1 kg (247 lb 1.6 oz)   SpO2 99%   Breastfeeding Unknown   BMI 42.41 kg/m    Patient Vitals for the past 24 hrs:   BP Temp Temp src Pulse Resp SpO2 Weight   02/15/25 0915 116/69 -- -- -- 16 99 % --   02/15/25 0800 134/73 97.8  F (36.6  C) Oral -- 18 100 % --   02/15/25 0700 (!) 141/79 97.9  F (36.6  C) Oral -- 20 98 % --   02/15/25 0602 -- -- -- -- -- -- 112.1 kg (247 lb 1.6 oz)   02/15/25 0552 -- -- -- -- -- 99 % --   02/15/25 0551 119/72 -- -- -- 18 -- -- "   02/15/25 0504 -- -- -- -- -- 98 % --   02/15/25 0500 120/65 -- -- -- 18 98 % --   02/15/25 0400 128/67 -- -- -- 18 100 % --   02/15/25 0302 -- -- -- -- -- 99 % --   02/15/25 0258 119/60 -- -- -- -- -- --   02/15/25 0202 -- -- -- -- -- 98 % --   02/15/25 0157 126/64 97.6  F (36.4  C) Oral -- 18 97 % --   02/15/25 0130 -- -- -- -- -- 98 % --   02/15/25 0115 -- -- -- -- -- 98 % --   02/15/25 0100 -- -- -- -- -- 97 % --   02/15/25 0057 109/67 -- -- -- 16 97 % --   02/15/25 0044 -- -- -- -- -- (!) 91 % --   02/14/25 2357 117/55 -- -- -- 18 96 % --   02/14/25 2256 116/68 98.1  F (36.7  C) Oral 77 18 97 % --   02/14/25 2200 132/65 98.1  F (36.7  C) Oral 80 18 96 % --   02/14/25 2115 116/56 -- -- 86 16 96 % --   02/14/25 2100 120/58 -- -- -- -- 96 % --   02/14/25 2045 131/62 -- -- -- -- 95 % --   02/14/25 2030 129/69 -- -- 89 16 97 % --   02/14/25 2010 126/68 98.1  F (36.7  C) Oral 87 16 96 % --   02/14/25 2005 -- -- -- -- -- 96 % --   02/14/25 1948 125/66 -- -- 85 16 96 % --   02/14/25 1930 (!) 165/70 -- -- -- 16 97 % --   02/14/25 1915 135/69 -- -- -- 18 96 % --   02/14/25 1859 -- -- -- -- -- 95 % --   02/14/25 1858 133/83 98  F (36.7  C) Oral -- 18 -- --   02/14/25 1844 -- -- -- -- -- 97 % --   02/14/25 1843 129/69 -- -- -- 16 -- --   02/14/25 1830 130/58 -- -- -- 18 -- --   02/14/25 1829 -- -- -- -- -- 96 % --   02/14/25 1818 137/75 -- -- -- 16 94 % --   02/14/25 1759 -- -- -- -- -- 96 % --   02/14/25 1758 132/77 -- -- -- 18 -- --   02/14/25 1742 129/73 98.8  F (37.1  C) -- -- 18 -- --   02/14/25 1739 -- -- -- -- -- 96 % --   02/14/25 1422 -- -- -- -- -- 96 % --   02/14/25 1419 139/86 -- -- -- 18 -- --   02/14/25 1409 -- -- -- -- -- 92 % --   02/14/25 1404 (!) 154/90 -- -- -- 20 93 % --   02/14/25 1402 -- -- -- -- -- 97 % --   02/14/25 1357 -- -- -- -- -- 98 % --   02/14/25 1352 -- -- -- -- -- 96 % --   02/14/25 1347 -- -- -- -- -- 98 % --   02/14/25 1342 -- -- -- -- -- 97 % --   02/14/25 1337 -- -- -- -- -- 97 % --    02/14/25 1332 -- -- -- -- -- 98 % --   02/14/25 1327 -- -- -- -- -- 96 % --   02/14/25 1322 -- -- -- -- -- 100 % --   02/14/25 1317 -- -- -- -- -- 98 % --   02/14/25 1314 (!) 149/81 -- -- -- -- -- --   02/14/25 1312 -- -- -- -- -- 97 % --   02/14/25 1307 -- -- -- -- -- 99 % --   02/14/25 1302 -- -- -- -- -- 98 % --   02/14/25 1257 -- -- -- -- -- 99 % --   02/14/25 1247 -- -- -- -- -- 99 % --   02/14/25 1243 (!) 142/97 -- -- -- 16 -- --   02/14/25 1129 134/69 -- -- -- 16 -- --   02/14/25 1100 135/70 -- -- -- 16 100 % --   02/14/25 1029 (!) 142/87 -- -- -- 16 -- --       Exam: Patient A&O x 3. No acute distress, breathing unlabored. The amount and color of the lochia is appropriate for the duration of recovery. Uterine fundus is firm at U-1. The low transverse surgical dressing is clean, dry and intact.  24hr I/O:  3761/4040  (2400cc urine output, 775cc in 8hrs of night shift). Still awaiting larger diuresis  LFTs normalizing. AST 36, ALT 54  Hgb 10.6  Platelets 171,000  Mag 7.0      Lab Results   Component Value Date    AS Negative 02/12/2025    HGB 10.6 (L) 02/15/2025       Immunization History   Administered Date(s) Administered    Influenza (High Dose) Trivalent,PF (Fluzone) 10/17/2024    TDAP (Adacel,Boostrix) 01/29/2025       Provider: Melina Roque MD    Date:  2/15/2025  Time:  9:47 AM

## 2025-02-15 NOTE — LACTATION NOTE
"This note was copied from a baby's chart.  Initial NICU Lactation Visit    Reason for Initial Lactation Visit: Lactation consultant to patient room per lactation order as infant in NICU.    Reason for infant admission to NICU:  Prematurity on ventilator     Delivery Method/Birth Hx: C/s on mag    Gestational Age at Delivery:  30w2d     Current Age:  24 hours    Method of Feedings:  NPO    Breastfeeding goals: undecided and 3-6 months, Wants to give breastmilk breast or bottle as long as baby needs, initially planned a few months    Past breastfeeding experience:  prime    Maternal health risk that may affect breastfeeding: Hypothyroid, Obesity, Infertility (IVF with donor egg, endometriosis), Hypertension, Pre-eclampsia,  Delivery, hx of sister with breast cancer; patient had 2 cyst \"tie off\" procedures last 2 years ago. Reports R breast firm area between 12 and 3 oclock during pregnancy that has resolved.     Breast Assessment: Breasts: Round, Asymmetrical, and Soft (L larger than R) , Nipples: Everted and Intact, very small. Aspect of breast listed above palpated soft.    Feeding Assessment:  NA    Pumping Assessment:  Sarah is pumping every 2-3 hours using the 21mm flange on initiate mode. She has very small nipples and may need a smaller flange size.     Visit Summary:  Reviewed details about how risk factors can impact milk supply.     Pump for home use: Symphony - plans to rent    Education:  [x] First drops kit  [x] Benefits of breast milk  [x] How breast milk is made  [x] Stages of milk production  [x] Milk supply/goal volumes  [x] Maternal risk factors that could affect milk supply   [] Hand expression  [x] Collecting, labeling, transporting milk  [x] Cleaning, sanitizing pump parts  [x] Storage of milk  [x] Importance of pumping minimum of 8x in 24 hours   [] Hands on Pumping   [] Hospital grade pump use and care  [x] Initiate setting   [] Maintain setting  [x] How to rent a hospital grade breast " pump  [] Engorgement  [] Latch and positioning   [] Signs of milk transfer  [] Nipple shield use and education   [] Nuzzling  [x] Review how to access lactation consultant prn

## 2025-02-15 NOTE — PLAN OF CARE
Vital signs stable. Postpartum assessment WDL. Incision clean, dry, intact with dressing in place. Pain controlled with tylenol and toradol. Patient ambulated to Bathroom with SBA of 2 staff and tolerated well. Rainey in place and did rainey cares.  Patient not passing gas yet and not had bowel movement yet.Breast pumped x 3 for baby in NICU. Continues on Mag 2g/hr and tolerating well. Will continue with current plan of care.   Problem:  Delivery  Goal: Bleeding is Controlled  Outcome: Progressing     Problem:  Delivery  Goal: Effective Oxygenation and Ventilation  Outcome: Progressing     Problem: Adult Inpatient Plan of Care  Goal: Optimal Comfort and Wellbeing  Outcome: Progressing         Plan of Care Reviewed With: patient    Overall Patient Progress: improvingOverall Patient Progress: improving

## 2025-02-15 NOTE — PROGRESS NOTES
Data: Sarah Parada transferred to [facility]/BOAB48-4 via wheelchair.  Patient transferred to postpartum and on her way stopped by NICU to visit her baby .    Action: Receiving unit notified of transfer. Patient and support person notified of room change. Patient and belongings accompanied by Registered Nurse. Bedside report given to CO RN. Fundal check performed with receiving RN. Oriented patient to surroundings. Call light within reach.    Response: Patient tolerated transfer with a minimal amount of pain and lightheadedness.    Laila Jackson RN  2/14/2025 9:52 PM

## 2025-02-15 NOTE — PROGRESS NOTES
Dr Roque called and vitals and QBL reviewed.  Orders reviewed.      Verbal report to CO, RN on postpartum.  Planning to transfer patient soon.

## 2025-02-15 NOTE — PLAN OF CARE
Problem: Adult Inpatient Plan of Care  Goal: Optimal Comfort and Wellbeing  Outcome: Progressing  Intervention: Provide Person-Centered Care  Recent Flowsheet Documentation  Taken 2/15/2025 1205 by Anika Norton RN  Trust Relationship/Rapport:   care explained   choices provided   questions answered   questions encouraged  Taken 2/15/2025 0800 by Anika Norton RN  Trust Relationship/Rapport:   care explained   choices provided   questions answered   questions encouraged     Problem:  Delivery  Goal: Bleeding is Controlled  Outcome: Progressing     Problem:  Delivery  Goal: Absence of Infection Signs and Symptoms  Outcome: Progressing   Goal Outcome Evaluation:      Plan of Care Reviewed With: patient    Overall Patient Progress: improvingOverall Patient Progress: improving    Outcome Evaluation: VSS. Fundus firm and at U, scant bleeding. Patient ambulating to and from bathroom and taking trips to NICU in wheelchair. Mag decreased to 1g/hr. Rashid discontinued, patient encouraged to drink. Family at bedside visiting.

## 2025-02-15 NOTE — PLAN OF CARE
Problem: Adult Inpatient Plan of Care  Goal: Plan of Care Review  Description: The Plan of Care Review/Shift note should be completed every shift.  The Outcome Evaluation is a brief statement about your assessment that the patient is improving, declining, or no change.  This information will be displayed automatically on your shift  note.  Outcome: Progressing  Flowsheets (Taken 2025 1856)  Outcome Evaluation: VSS. Fundus firm at U, scant bleeding. Denied pre-eclampsia symtpoms. Mag at 2grams/hr, order to stay on 24 hours post delivery. Rashid cath to stay in for 24 hours to montior I/O.  Plan of Care Reviewed With: patient  Overall Patient Progress: improving     Problem: Hypertensive Disorders in Pregnancy  Goal: Patient-Fetal Stabilization  Outcome: Progressing     Problem:  Delivery  Goal: Bleeding is Controlled  Outcome: Progressing  Goal: Absence of Infection Signs and Symptoms  Outcome: Progressing  Intervention: Prevent or Manage Infection  Recent Flowsheet Documentation  Taken 2025 1831 by Erica Inman, RN  Infection Prevention:   rest/sleep promoted   single patient room provided   hand hygiene promoted   Goal Outcome Evaluation:      Plan of Care Reviewed With: patient    Overall Patient Progress: improvingOverall Patient Progress: improving    Outcome Evaluation: VSS. Fundus firm at U, scant bleeding. Denied pre-eclampsia symtpoms. Mag at 2grams/hr, order to stay on 24 hours post delivery. Rashid cath to stay in for 24 hours to montior I/O.

## 2025-02-16 LAB
ALT SERPL W P-5'-P-CCNC: 41 U/L (ref 0–50)
ANION GAP SERPL CALCULATED.3IONS-SCNC: 10 MMOL/L (ref 7–15)
ANION GAP SERPL CALCULATED.3IONS-SCNC: 11 MMOL/L (ref 7–15)
AST SERPL W P-5'-P-CCNC: 33 U/L (ref 0–45)
BUN SERPL-MCNC: 11.8 MG/DL (ref 6–20)
BUN SERPL-MCNC: 14.6 MG/DL (ref 6–20)
CALCIUM SERPL-MCNC: 8.7 MG/DL (ref 8.8–10.4)
CALCIUM SERPL-MCNC: 9.3 MG/DL (ref 8.8–10.4)
CHLORIDE SERPL-SCNC: 104 MMOL/L (ref 98–107)
CHLORIDE SERPL-SCNC: 106 MMOL/L (ref 98–107)
CREAT SERPL-MCNC: 0.73 MG/DL (ref 0.51–0.95)
CREAT SERPL-MCNC: 0.75 MG/DL (ref 0.51–0.95)
EGFRCR SERPLBLD CKD-EPI 2021: >90 ML/MIN/1.73M2
EGFRCR SERPLBLD CKD-EPI 2021: >90 ML/MIN/1.73M2
ERYTHROCYTE [DISTWIDTH] IN BLOOD BY AUTOMATED COUNT: 15.1 % (ref 10–15)
GLUCOSE SERPL-MCNC: 82 MG/DL (ref 70–99)
GLUCOSE SERPL-MCNC: 95 MG/DL (ref 70–99)
HCO3 SERPL-SCNC: 23 MMOL/L (ref 22–29)
HCO3 SERPL-SCNC: 24 MMOL/L (ref 22–29)
HCT VFR BLD AUTO: 31.9 % (ref 35–47)
HGB BLD-MCNC: 10.6 G/DL (ref 11.7–15.7)
MCH RBC QN AUTO: 31.1 PG (ref 26.5–33)
MCHC RBC AUTO-ENTMCNC: 33.2 G/DL (ref 31.5–36.5)
MCV RBC AUTO: 94 FL (ref 78–100)
PLATELET # BLD AUTO: 176 10E3/UL (ref 150–450)
POTASSIUM SERPL-SCNC: 4.1 MMOL/L (ref 3.4–5.3)
POTASSIUM SERPL-SCNC: 4.2 MMOL/L (ref 3.4–5.3)
RBC # BLD AUTO: 3.41 10E6/UL (ref 3.8–5.2)
SODIUM SERPL-SCNC: 139 MMOL/L (ref 135–145)
SODIUM SERPL-SCNC: 139 MMOL/L (ref 135–145)
WBC # BLD AUTO: 10.7 10E3/UL (ref 4–11)

## 2025-02-16 PROCEDURE — 84460 ALANINE AMINO (ALT) (SGPT): CPT | Performed by: OBSTETRICS & GYNECOLOGY

## 2025-02-16 PROCEDURE — 120N000013 HC R&B IMCU

## 2025-02-16 PROCEDURE — 36415 COLL VENOUS BLD VENIPUNCTURE: CPT | Performed by: OBSTETRICS & GYNECOLOGY

## 2025-02-16 PROCEDURE — 82374 ASSAY BLOOD CARBON DIOXIDE: CPT

## 2025-02-16 PROCEDURE — 80048 BASIC METABOLIC PNL TOTAL CA: CPT

## 2025-02-16 PROCEDURE — 250N000011 HC RX IP 250 OP 636: Performed by: OBSTETRICS & GYNECOLOGY

## 2025-02-16 PROCEDURE — 85014 HEMATOCRIT: CPT | Performed by: OBSTETRICS & GYNECOLOGY

## 2025-02-16 PROCEDURE — 84450 TRANSFERASE (AST) (SGOT): CPT | Performed by: OBSTETRICS & GYNECOLOGY

## 2025-02-16 PROCEDURE — 250N000013 HC RX MED GY IP 250 OP 250 PS 637: Performed by: OBSTETRICS & GYNECOLOGY

## 2025-02-16 PROCEDURE — 36415 COLL VENOUS BLD VENIPUNCTURE: CPT

## 2025-02-16 PROCEDURE — 250N000013 HC RX MED GY IP 250 OP 250 PS 637

## 2025-02-16 RX ORDER — NIFEDIPINE 30 MG
60 TABLET, EXTENDED RELEASE ORAL DAILY
Status: DISCONTINUED | OUTPATIENT
Start: 2025-02-17 | End: 2025-02-17 | Stop reason: HOSPADM

## 2025-02-16 RX ORDER — LIDOCAINE 4 G/G
1 PATCH TOPICAL
Status: DISCONTINUED | OUTPATIENT
Start: 2025-02-16 | End: 2025-02-17 | Stop reason: HOSPADM

## 2025-02-16 RX ORDER — LABETALOL 200 MG/1
400 TABLET, FILM COATED ORAL EVERY 8 HOURS SCHEDULED
Status: DISCONTINUED | OUTPATIENT
Start: 2025-02-16 | End: 2025-02-17

## 2025-02-16 RX ORDER — NIFEDIPINE 30 MG
30 TABLET, EXTENDED RELEASE ORAL ONCE
Status: COMPLETED | OUTPATIENT
Start: 2025-02-16 | End: 2025-02-16

## 2025-02-16 RX ORDER — FUROSEMIDE 20 MG/1
20 TABLET ORAL ONCE
Status: COMPLETED | OUTPATIENT
Start: 2025-02-16 | End: 2025-02-16

## 2025-02-16 RX ADMIN — LABETALOL HYDROCHLORIDE 400 MG: 200 TABLET ORAL at 07:52

## 2025-02-16 RX ADMIN — ENOXAPARIN SODIUM 40 MG: 40 INJECTION SUBCUTANEOUS at 06:09

## 2025-02-16 RX ADMIN — ACETAMINOPHEN 975 MG: 325 TABLET ORAL at 19:02

## 2025-02-16 RX ADMIN — IBUPROFEN 800 MG: 800 TABLET ORAL at 00:15

## 2025-02-16 RX ADMIN — NIFEDIPINE 30 MG: 30 TABLET, EXTENDED RELEASE ORAL at 13:38

## 2025-02-16 RX ADMIN — FUROSEMIDE 20 MG: 20 TABLET ORAL at 19:47

## 2025-02-16 RX ADMIN — ACETAMINOPHEN 975 MG: 325 TABLET ORAL at 12:29

## 2025-02-16 RX ADMIN — ACETAMINOPHEN 975 MG: 325 TABLET ORAL at 00:14

## 2025-02-16 RX ADMIN — LIDOCAINE 1 PATCH: 4 PATCH TOPICAL at 19:46

## 2025-02-16 RX ADMIN — FAMOTIDINE 20 MG: 20 TABLET, FILM COATED ORAL at 19:47

## 2025-02-16 RX ADMIN — LABETALOL HYDROCHLORIDE 400 MG: 200 TABLET ORAL at 14:36

## 2025-02-16 RX ADMIN — ACETAMINOPHEN 975 MG: 325 TABLET ORAL at 06:09

## 2025-02-16 RX ADMIN — FAMOTIDINE 20 MG: 20 TABLET, FILM COATED ORAL at 09:28

## 2025-02-16 RX ADMIN — ENOXAPARIN SODIUM 40 MG: 40 INJECTION SUBCUTANEOUS at 18:01

## 2025-02-16 RX ADMIN — IBUPROFEN 800 MG: 800 TABLET ORAL at 12:29

## 2025-02-16 RX ADMIN — IBUPROFEN 800 MG: 800 TABLET ORAL at 19:02

## 2025-02-16 RX ADMIN — NIFEDIPINE 30 MG: 30 TABLET, EXTENDED RELEASE ORAL at 09:28

## 2025-02-16 RX ADMIN — LABETALOL HYDROCHLORIDE 400 MG: 200 TABLET ORAL at 21:50

## 2025-02-16 RX ADMIN — IBUPROFEN 800 MG: 800 TABLET ORAL at 06:09

## 2025-02-16 ASSESSMENT — ACTIVITIES OF DAILY LIVING (ADL)
ADLS_ACUITY_SCORE: 30

## 2025-02-16 NOTE — PLAN OF CARE
POD#2 Vital signs stable. Postpartum assessment WDL. Incision clean, dry, intact and open to air with adhesive. Pain controlled with motrin and tylenol. Patient ambulating and voiding independently. Patient passing gas and has had bowel movement. Breast pumping for her son in nicu and getting some colostrum. Her  at bedside and supportive.Will continue with current plan of care.   Problem: Adult Inpatient Plan of Care  Goal: Optimal Comfort and Wellbeing  Outcome: Progressing   Problem:  Delivery  Goal: Effective Oxygenation and Ventilation  Outcome: Progressing     Plan of Care Reviewed With: patient, spouse    Overall Patient Progress: improvingOverall Patient Progress: improving

## 2025-02-16 NOTE — PROGRESS NOTES
" Postpartum Day 2    Patient Name:  Sarah Parada  :  1990  MRN:  [id number]      Assessment/Plan:    S/P primary LTCS at 30w2d  Continue current care  Superimposed pre-eclampsia with SF   S/P magnesium sulfate x 24 hours post-delivery- discontinued yesterday at 1630.   Currently on Nifedipine 30 mg XL daily and labetalol 400 mg BID- reviewed BP readings with Dr. Guzman, plan to increase nifedipine dose to 60 mg XL daily and labetalol to 400 mg TID.   Change in breast tissue with strong family history of breast cancer (malignancy vs edema from HTN)  Breast U/S ordered for tomorrow.  Discussed multiple potential etiologies, including tissue changes from pregnancy, edema, and milk production vs malignancy.  4. Discharge home tomorrow pending better BP control.      Subjective:  The patient feels well:  Voiding without difficulty, lochia normal, tolerating normal diet, and passing flatus. Denies headache, visual changes, RUQ pain, N/V, SOB, and dizziness. Pain is well controlled with current medications. She is feeling OK emotionally-  arrived yesterday from [place] (was there for a work trip), so happy he's here. Just feeling overwhelmed and worried with baby in NICU. She reports overall baby is doing well.     Sarah also voices concerns surrounding some breast changes over the past few weeks she noticed changes to her breasts, including a \"firm band of tissue\" in the LLQ of the left breast, as well as some raised patches of skin on both areola. She has a significant family history of breast cancer- both grandmothers had it (unsure of age), and her sister was diagnosed with triple negative breast cancer at age 36. In 2017 she had a biopsy of the left breast which was negative. She historically has gotten annual mammograms, all of which have been negative but notable for very dense breast tissue. Most recent mammogram was in - lapse d/t IVF.     YOB: 2025    Birth Time: " "4:31 PM    Prenatal Complications:    cHTN on Labetalol 400mg BID  AMA  IVF pregnancy (donor egg)  BMI 42  Possible secondary hypothyroidism  Marginal cord insertion  Superimposed preeclampsia with SF at 30wks    Objective:  BP (!) 149/72 (BP Location: Left arm, Cuff Size: Adult Regular)   Pulse 78   Temp 99.1  F (37.3  C) (Oral)   Resp 20   Ht 1.626 m (5' 4\")   Wt 111 kg (244 lb 11.4 oz)   SpO2 96%   Breastfeeding Unknown   BMI 42.00 kg/m    Patient Vitals for the past 24 hrs:   BP Temp Temp src Pulse Resp SpO2 Weight   02/16/25 1223 (!) 149/72 99.1  F (37.3  C) Oral -- 20 96 % --   02/16/25 0759 (!) 148/74 98.7  F (37.1  C) Oral -- 16 97 % --   02/16/25 0709 -- -- -- -- -- -- 111 kg (244 lb 11.4 oz)   02/16/25 0609 (!) 153/78 99  F (37.2  C) Oral 78 18 96 % --   02/16/25 0015 120/72 98.1  F (36.7  C) Oral 90 18 97 % --   02/15/25 2013 (!) 145/81 98.9  F (37.2  C) Oral -- 18 98 % --   02/15/25 1615 139/81 98.4  F (36.9  C) Oral -- 18 99 % --   02/15/25 1525 (!) 157/90 -- -- -- 18 99 % --   02/15/25 1435 134/84 -- -- -- 16 97 % --   02/15/25 1305 118/66 -- -- -- -- -- --       Exam: Patient A&O x 3. No acute distress, breathing unlabored. The amount and color of the lochia is appropriate for the duration of recovery. Urinary output is adequate. The uncovered low transverse incision is healing well. Moderate amount of superficial bruising noted surrounding incision. The patient is ambulating well without assistance, tolerating a regular diet.    Breasts: Peau d'orange skin noted in LLQ of left breast. Skin is firmer than surrounding area, though no palpable mass. Raised area approximately the size of a quarter on the areola of the right breast at 2 o'clock.     Lab Results   Component Value Date    AS Negative 02/12/2025    HGB 10.6 (L) 02/16/2025       Immunization History   Administered Date(s) Administered    Influenza (High Dose) Trivalent,PF (Fluzone) 10/17/2024    TDAP (Adacel,Boostrix) 01/29/2025 "         Provider:  MARCELL Bruno CNM    Date:  2/16/2025  Time:  12:53 PM

## 2025-02-16 NOTE — PROVIDER NOTIFICATION
Notified  CNM  at 1730 PM regarding change in condition.      Comments: Concerns with patients increased lower extremity swelling. While discussing this earlier with provider, declined lasix due to increasing blood pressure. Patient states leg swelling bilaterally is worse and is becoming more uncomfortable. While assessing reflexes, noted 5 beats of clonus and brisker reflexes. This AM, no clonus and normal/brisk reflexes noted. Discussed lower urine output. Patient unsure if she had voided more than 100 ml in past 4 hours, urine is concentrated.    Spoke with: Betzaida Ham, will consult with Dr. Guzman.    1810: Per Betzaida Ham, [facility] first, if normal, will do lasix.

## 2025-02-16 NOTE — PROGRESS NOTES
Subjective:   Denies headache, visual changes, RUQ pain, N/V, SOB, and dizziness.     Objective:     VS:Patient Vitals for the past 24 hrs:   BP Temp Temp src Pulse Resp SpO2 Weight   02/16/25 1940 (!) 140/76 99.1  F (37.3  C) Oral -- 18 97 % --   02/16/25 1712 127/70 98.2  F (36.8  C) Oral -- 18 98 % --   02/16/25 1223 (!) 149/72 99.1  F (37.3  C) Oral -- 20 96 % --   02/16/25 0759 (!) 148/74 98.7  F (37.1  C) Oral -- 16 97 % --   02/16/25 0709 -- -- -- -- -- -- 111 kg (244 lb 11.4 oz)   02/16/25 0609 (!) 153/78 99  F (37.2  C) Oral 78 18 96 % --   02/16/25 0015 120/72 98.1  F (36.7  C) Oral 90 18 97 % --   02/15/25 2013 (!) 145/81 98.9  F (37.2  C) Oral -- 18 98 % --       Resp: regular and unlabored- LS clear bilaterally per RN  Extremities: Significant increase in edema, 3+ to 4+.   I&O over last 10 hrs = 550 ml    Assessment:  S/P primary LTCS at 30w2d  Superimposed pre-eclampsia with SF   Increased edema  Normal renal function    Plan:   - No creatinine since yesterday- orders placed for STAT BMP. Lab performed on previous blood drawn this AM- new order placed for new collection: WNL, will proceed with PO Lasix  20mg x 1 dose.   - Continue close monitoring of I&O  -Yakov hose in place bilaterally  - Monitor BP per orders    MARCELL Bruno CNM  02/16/25  7:46 PM

## 2025-02-16 NOTE — LACTATION NOTE
This note was copied from a baby's chart.  Follow up NICU Lactation Visit    Gestational Age at Delivery:  30w2d     Corrected Gestational Age:  30w4d    Current Age:  2 days    Method of Feedings:   NPO    Breast Assessment: Nipples: Reddened Breasts: See below note, dense areola; dense area on top of right breast. Sarah report this is no change from pregnancy. Did notice growth in especially right breast in pregnancy.       Hand Hugs/STS/Nuzzling/Latching/ Weighted feeds:  Hand hugs and STS    Visit Summary:    We reviewed history of breast tissue in detail. Sarah reports a wedge from the outside of the areola to the chest wall (a wedge) at 9o'clock during pregnancy. This tissue is soft and this seemed to resolve just before delivery. Her provider was aware but they never follow up due to  delivery. She said it was worse when wearing a supportive/ underwire bra. Not wearing a bra currently.     Another LC noticed a 1cm size red line at 6 o'clock. Sarah has not seen this before and reports this is not a scar to her knowledge. Her procedure to tie off two cysts in 2017 was in the same area of the wedge. This used a pin point device in this procedure. No visible external scars.     Last night (now two times), she had a quarter size area of the areola at 8o'clock that was swollen and raised. Areola tissue is dense but symmetrical on both sides. She noticed this before pumping both times and is aware to call her RN if this happens again. Provider is being notified and will assess today.     Pumping Volume p/24hours:  Continues pumping 8x/24 hours getting increasing amounts. Puddles at this pumping session. Slight pinching with pumping session, encouraged to turn down suction until no pain. Using nipple cream with pumping and expressed colostrum on nipples. Nipples are reddened and likely will need smaller flanges. Discussed ordering 17 and 19 to trial.     Pump for home use: Needs symphony at  DC    Adjustments to Plan:  Continue on initiate mode. Use RPS if any changes in areola. Review changes in breast tissue with provider. Turn suction down with any pain.     Education:  [] First drops kit  [] Benefits of breast milk  [] How breast milk is made  [x] Stages of milk production  [] Milk supply/goal volumes  [] Maternal risk factors that could affect milk supply   [x] Hand expression  [] Collecting, labeling, transporting milk  [] Cleaning, sanitizing pump parts  [] Storage of milk  [] Importance of pumping minimum of 8x in 24 hours   [] Hands on Pumping   [] Hospital grade pump use and care  [x] Initiate setting   [] Maintain setting  [] How to rent a hospital grade breast pump  [x] Engorgement  [] Increasing milk supply  [] Mastitis/Ductal narrowing  [] Weighted feeding  [] Nipple shield  [] Latch and positioning   [] Signs of milk transfer  [] Nuzzling  [x] Review how to access lactation consultant prn   [] Outpatient Lactation  [] Feeding Plan for home

## 2025-02-16 NOTE — PLAN OF CARE
Problem: Adult Inpatient Plan of Care  Goal: Optimal Comfort and Wellbeing  2/15/2025 1841 by Anika Norton RN  Outcome: Progressing  2/15/2025 1220 by Anika Norton RN  Outcome: Progressing  Intervention: Provide Person-Centered Care  Recent Flowsheet Documentation  Taken 2/15/2025 1615 by Anika Norton RN  Trust Relationship/Rapport:   care explained   choices provided   questions answered   questions encouraged  Taken 2/15/2025 1205 by Anika Norton RN  Trust Relationship/Rapport:   care explained   choices provided   questions answered   questions encouraged  Taken 2/15/2025 0800 by Anika Norton RN  Trust Relationship/Rapport:   care explained   choices provided   questions answered   questions encouraged     Problem:  Delivery  Goal: Bleeding is Controlled  2/15/2025 1841 by Anika Norton RN  Outcome: Progressing  2/15/2025 1220 by Anika Norton RN  Outcome: Progressing     Problem:  Delivery  Goal: Absence of Infection Signs and Symptoms  2/15/2025 1841 by Anika Norton RN  Outcome: Progressing  2/15/2025 1220 by Anika Norton RN  Outcome: Progressing   Goal Outcome Evaluation:      Plan of Care Reviewed With: patient    Overall Patient Progress: improvingOverall Patient Progress: improving    Outcome Evaluation: VSS. Mag discontinued at 1630. PIV x2 saline locked. Fundus firm and at U, scant bleeding. Voiding spontaneously. Pain managed with scheduled pain meds. Lovenox started for DVT prophylaxis. Ambulating to and from NICU to visit baby.  at bedside.

## 2025-02-17 VITALS
TEMPERATURE: 98.4 F | RESPIRATION RATE: 16 BRPM | DIASTOLIC BLOOD PRESSURE: 79 MMHG | HEIGHT: 64 IN | HEART RATE: 96 BPM | BODY MASS INDEX: 41.96 KG/M2 | SYSTOLIC BLOOD PRESSURE: 140 MMHG | OXYGEN SATURATION: 96 % | WEIGHT: 245.8 LBS

## 2025-02-17 LAB — PLATELET # BLD AUTO: 198 10E3/UL (ref 150–450)

## 2025-02-17 PROCEDURE — 250N000013 HC RX MED GY IP 250 OP 250 PS 637

## 2025-02-17 PROCEDURE — 250N000011 HC RX IP 250 OP 636

## 2025-02-17 PROCEDURE — 250N000013 HC RX MED GY IP 250 OP 250 PS 637: Performed by: STUDENT IN AN ORGANIZED HEALTH CARE EDUCATION/TRAINING PROGRAM

## 2025-02-17 PROCEDURE — 36415 COLL VENOUS BLD VENIPUNCTURE: CPT | Performed by: OBSTETRICS & GYNECOLOGY

## 2025-02-17 PROCEDURE — 250N000013 HC RX MED GY IP 250 OP 250 PS 637: Performed by: OBSTETRICS & GYNECOLOGY

## 2025-02-17 PROCEDURE — 250N000011 HC RX IP 250 OP 636: Performed by: OBSTETRICS & GYNECOLOGY

## 2025-02-17 PROCEDURE — 85049 AUTOMATED PLATELET COUNT: CPT | Performed by: OBSTETRICS & GYNECOLOGY

## 2025-02-17 RX ORDER — IBUPROFEN 800 MG/1
800 TABLET, FILM COATED ORAL EVERY 6 HOURS
Qty: 60 TABLET | Refills: 0 | Status: SHIPPED | OUTPATIENT
Start: 2025-02-17

## 2025-02-17 RX ORDER — OXYCODONE HYDROCHLORIDE 5 MG/1
5 TABLET ORAL EVERY 4 HOURS PRN
Qty: 10 TABLET | Refills: 0 | Status: SHIPPED | OUTPATIENT
Start: 2025-02-17

## 2025-02-17 RX ORDER — FUROSEMIDE 20 MG/1
20 TABLET ORAL ONCE
Status: COMPLETED | OUTPATIENT
Start: 2025-02-17 | End: 2025-02-17

## 2025-02-17 RX ORDER — LABETALOL 200 MG/1
600 TABLET, FILM COATED ORAL EVERY 8 HOURS SCHEDULED
Status: DISCONTINUED | OUTPATIENT
Start: 2025-02-17 | End: 2025-02-17 | Stop reason: HOSPADM

## 2025-02-17 RX ORDER — FUROSEMIDE 10 MG/ML
20 INJECTION INTRAMUSCULAR; INTRAVENOUS ONCE
Status: COMPLETED | OUTPATIENT
Start: 2025-02-17 | End: 2025-02-17

## 2025-02-17 RX ORDER — LABETALOL 300 MG/1
600 TABLET, FILM COATED ORAL EVERY 8 HOURS
Qty: 60 TABLET | Refills: 0 | Status: SHIPPED | OUTPATIENT
Start: 2025-02-17

## 2025-02-17 RX ORDER — ACETAMINOPHEN 325 MG/1
975 TABLET ORAL EVERY 6 HOURS
Qty: 60 TABLET | Refills: 1 | Status: SHIPPED | OUTPATIENT
Start: 2025-02-17

## 2025-02-17 RX ADMIN — LABETALOL HYDROCHLORIDE 400 MG: 200 TABLET ORAL at 06:38

## 2025-02-17 RX ADMIN — ACETAMINOPHEN 975 MG: 325 TABLET ORAL at 12:45

## 2025-02-17 RX ADMIN — FUROSEMIDE 20 MG: 20 TABLET ORAL at 12:45

## 2025-02-17 RX ADMIN — ACETAMINOPHEN 975 MG: 325 TABLET ORAL at 00:18

## 2025-02-17 RX ADMIN — FUROSEMIDE 20 MG: 10 INJECTION, SOLUTION INTRAMUSCULAR; INTRAVENOUS at 00:49

## 2025-02-17 RX ADMIN — LABETALOL HYDROCHLORIDE 600 MG: 200 TABLET ORAL at 15:05

## 2025-02-17 RX ADMIN — IBUPROFEN 800 MG: 800 TABLET ORAL at 00:18

## 2025-02-17 RX ADMIN — ACETAMINOPHEN 975 MG: 325 TABLET ORAL at 06:27

## 2025-02-17 RX ADMIN — IBUPROFEN 800 MG: 800 TABLET ORAL at 12:45

## 2025-02-17 RX ADMIN — NIFEDIPINE 60 MG: 30 TABLET, EXTENDED RELEASE ORAL at 08:02

## 2025-02-17 RX ADMIN — FAMOTIDINE 20 MG: 20 TABLET, FILM COATED ORAL at 08:02

## 2025-02-17 RX ADMIN — IBUPROFEN 800 MG: 800 TABLET ORAL at 06:27

## 2025-02-17 RX ADMIN — ENOXAPARIN SODIUM 40 MG: 40 INJECTION SUBCUTANEOUS at 06:29

## 2025-02-17 ASSESSMENT — ACTIVITIES OF DAILY LIVING (ADL)
ADLS_ACUITY_SCORE: 30

## 2025-02-17 NOTE — PLAN OF CARE
Problem: Adult Inpatient Plan of Care  Goal: Optimal Comfort and Wellbeing  Outcome: Progressing  Intervention: Monitor Pain and Promote Comfort  Recent Flowsheet Documentation  Taken 2/16/2025 1940 by Racquel Saldana RN  Pain Management Interventions: medication (see MAR)  Taken 2/16/2025 1229 by Racquel Saldana RN  Pain Management Interventions:   medication (see MAR)   cold applied  Taken 2/16/2025 0759 by Racquel Saldana RN  Pain Management Interventions: (abdomen binder)   declines   cold applied   other (see comments)  Intervention: Provide Person-Centered Care  Recent Flowsheet Documentation  Taken 2/16/2025 1712 by Racquel Saldana RN  Trust Relationship/Rapport:   care explained   choices provided   questions answered   questions encouraged  Taken 2/16/2025 0800 by Racquel Saldana RN  Trust Relationship/Rapport:   care explained   choices provided   questions answered   questions encouraged   Goal Outcome Evaluation:      Plan of Care Reviewed With: patient    Overall Patient Progress: improvingOverall Patient Progress: improving    Hypertensive at times, increasing reflexes and clonus throughout the day. Bilateral edema in lower extremities increasing up to +3 and into thigh area. Yakov hose applied, discussed using SCDs in bed. BMP ordered. Advised to increase water intake due to decreased urine output this afternoon. Education given on how increasing water can be helpful to kidneys and breast milk. Uterus firm, bleeding light, increasing a little with activity. Incision with bruising, clean, dry, intact. Up to NICU throughout shift. Ibuprofen and Tylenol for pain with ice and lidocaine ordered for later tonight.

## 2025-02-17 NOTE — PROVIDER NOTIFICATION
Pt 0733 BP was 140/80. 1130 BP was 153/96. Pt denies headache, visual changes, and epigastric pain. Pt has brisk patellar reflexes and 3+ dependent edema. MD notified - stated was on the floor and will round, but was planning to increase medications.     Labetalol increased from 400mg TID to 600mg TID. 60mg Nifedipine stays the same. Oral lasix ordered. Plans to discharge today evening after a couple hours of watching vitals.

## 2025-02-17 NOTE — PROVIDER NOTIFICATION
Betzaida Ham CNM called for update regarding patient. Finished most recent vitals and called provider back. Update given regarding latest /83. She was given scheduled labetolol. Pitting edema remains present in lower legs. She has had 800 ml output following oral lasix, and 1800 ml following IV lasix. She denied pre E symptoms, denied SOB. Provider plans to evaluate patient status and will reach out to OB. She stated she would place orders upon further discussion/eval and determine next steps for plan of care. Erica Rubin RN on 2/17/2025 at 7:12 AM

## 2025-02-17 NOTE — LACTATION NOTE
This note was copied from a baby's chart.  This writer met with Sarah to rent her a hospital grade breast pump (HGP).  She only pumped 10 ml this noon, so will remain on Initiate program until she has pumped 20 ml at least 3 times in 24 hours.  Desires a follow up tomorrow, if able, when she visits infant in NICU.

## 2025-02-17 NOTE — PROVIDER NOTIFICATION
Called Betzaida Ham at 0033 for patient status update and patient concern/request regarding ongoing lower extremity edema. Patient feels increased tightness. Pitting edema present. She denies pre E symptoms, SOB. Did receive oral lasix in the evening and tracking I/Os. Voiding without issue. Wt up from yesterday am. BP improved on last check 118/63. She is up ad wu in room and tolerating and walking in halls to see  in NICU.     Provider ordered dose of IV lasix. Patient updated and medication given.     Erica Rubin RN on 2025 at 1:01 AM

## 2025-02-17 NOTE — DISCHARGE INSTRUCTIONS
Warning Signs after Having a Baby    Keep this paper on your fridge or somewhere else where you can see it.    Call your provider if you have any of these symptoms up to 12 weeks after having your baby.    Thoughts of hurting yourself or your baby  Pain in your chest or trouble breathing  Severe headache not helped by pain medicine  Eyesight concerns (blurry vision, seeing spots or flashes of light, other changes to eyesight)  Fainting, shaking or other signs of a seizure    Call 9-1-1 if you feel that it is an emergency.     The symptoms below can happen to anyone after giving birth. They can be very serious. Call your provider if you have any of these warning signs.    My provider s phone number: _______________________    Losing too much blood (hemorrhage)    Call your provider if you soak through a pad in less than an hour or pass blood clots bigger than a golf ball. These may be signs that you are bleeding too much.    Blood clots in the legs or lungs    After you give birth, your body naturally clots its blood to help prevent blood loss. Sometimes this increased clotting can happen in other areas of the body, like the legs or lungs. This can block your blood flow and be very dangerous.     Call your provider if you:  Have a red, swollen spot on the back of your leg that is warm or painful when you touch it.   Are coughing up blood.     Infection    Call your provider if you have any of these symptoms:  Fever of 100.4 F (38 C) or higher.  Pain or redness around your stitches if you had an incision.   Any yellow, white, or green fluid coming from places where you had stitches or surgery.    Mood Problems (postpartum depression)    Many people feel sad or have mood changes after having a baby. But for some people, these mood swings are worse.     Call your provider right away if you feel so anxious or nervous that you can't care for yourself or your baby.    Preeclampsia (high blood pressure)    Even if you  didn't have high blood pressure when you were pregnant, you are at risk for the high blood pressure disease called preeclampsia. This risk can last up to 12 weeks after giving birth.     Call your provider if you have:   Pain on your right side under your rib cage  Sudden swelling in the hands and face    Remember: You know your body. If something doesn't feel right, get medical help.     For informational purposes only. Not to replace the advice of your health care provider. Copyright 2020 [facility]. All rights reserved. Clinically reviewed by Margaux Armstrong, RNC-OB, MSN. [employer] [id number] - Rev 02/23.

## 2025-02-17 NOTE — PROGRESS NOTES
" Postpartum Day 3    Patient Name:  Sarah Parada  :  1990  MRN:  [id number]            Assessment/Plan:    S/P primary LTCS at 30w2d  Continue current care  Superimposed pre-eclampsia with SF   S/P magnesium sulfate x 24 hours post-delivery- discontinued yesterday at 1630.   Currently on Nifedipine 30 mg XL daily and labetalol 400 mg TID, Bps are high mild so will increase to 600mg TID.  Change in breast tissue with strong family history of breast cancer (malignancy vs edema from HTN), outpatient breast imaging arranged   LE edema: s/p 2 doses lasix yesterday with adequate diuresis however no improvement in edema yet. Is bilateral and pitting. BMP normal yesterday so will given another dose of lasix. Encourage compresses, ambulation, low sodium diet  Dispo: stable for discharge this afternoon, pending lasix and increase antihypertensive. Patient receptive to discharge.     Subjective:  The patient feels well:  Voiding without difficulty, lochia normal, tolerating normal diet, and passing flatus. Denies headache, visual changes, RUQ pain, N/V, SOB, and dizziness. Pain is well controlled with current medications. Continued to be bothered by LE edema.              YOB: 2025  Birth Time: 4:31 PM    Prenatal Complications:    cHTN on Labetalol 400mg BID  AMA  IVF pregnancy (donor egg)  BMI 42  Possible secondary hypothyroidism  Marginal cord insertion  Superimposed preeclampsia with SF at 30wks    Objective:  BP (!) 153/96   Pulse 96   Temp 98.6  F (37  C) (Oral)   Resp 18   Ht 1.626 m (5' 4\")   Wt 111.5 kg (245 lb 12.8 oz)   SpO2 96%   Breastfeeding Unknown   BMI 42.19 kg/m    Patient Vitals for the past 24 hrs:   BP Temp Temp src Pulse Resp SpO2 Weight   25 1130 (!) 153/96 -- -- 96 -- -- --   25 0733 (!) 140/80 98.6  F (37  C) Oral 84 18 96 % --   25 0638 (!) 151/83 -- -- 86 -- -- --   25 0405 (!) 140/82 98.4  F (36.9  C) Oral 91 16 98 % --   25 " 0024 -- -- -- -- -- -- 111.5 kg (245 lb 12.8 oz)   02/17/25 0015 118/63 98.6  F (37  C) Oral -- 18 97 % --   02/16/25 2150 133/67 -- -- 90 -- -- --   02/16/25 1940 (!) 140/76 99.1  F (37.3  C) Oral -- 18 97 % --   02/16/25 1712 127/70 98.2  F (36.8  C) Oral -- 18 98 % --       Exam: Patient A&O x 3. No acute distress, breathing unlabored. The amount and color of the lochia is appropriate for the duration of recovery. Uterine fundus is firm at U-2.  The incision is healing well.  The patient is ambulating well.  The patient is tolerating a regular diet.    Lab Results   Component Value Date    AS Negative 02/12/2025    HGB 10.6 (L) 02/16/2025       Immunization History   Administered Date(s) Administered    Influenza (High Dose) Trivalent,PF (Fluzone) 10/17/2024    TDAP (Adacel,Boostrix) 01/29/2025       Provider:  Rosendo Guzman MD    Date:  2/17/2025  Time:  12:29 PM

## 2025-02-17 NOTE — DISCHARGE SUMMARY
Physician Discharge Summary     Patient ID:  Sarah Parada  [id number]  34 year old  1990    Admit date: 2025    Discharge date and time: 2025     Admitting Physician: Melina Roque MD     Discharge Physician: Rosendo Guzman MD    Admission Diagnoses: Intrauterine pregnancy [Z34.90]  Pre-eclampsia superimposed on chronic hypertension, antepartum [O11.9]  Single liveborn, born in hospital, delivered by  delivery [Z38.01]    Discharge Diagnoses: postpartum, superimposed preeclampsia     Admission Condition: poor    Discharged Condition: fair    Indication for Admission: delivery, preeclampsia care     Hospital Course:  Sarah is a 34yr old  at 30+2wks with chronic hypertension who was admitted 25 at 30+0wks with elevated blood pressures. Diagnosed with superimposed preeclampsia with severe features 25 by elevated LFTs.  Bps and labs stabilized on hospital bedrest, IV magnesium sulfate while receiving  betamethasone. Received betamethasone for fetal lung maturity. She developed worsening symptoms of preeclampsia so as such recommended proceeding with delivery by primary CS, which occurred on 25 and was uncomplicated. Postpartum she received 24h magnesium sulfate and her antihypertensive regimen was titrated. She also received lasix for lower extermity edema. On POD#4 she was deemed stable for discharge with close follow up.    Treatments: , iv magnesium, oral antihypertensives     Discharge Exam:  Exam: Patient A&O x 3. No acute distress, breathing unlabored. The amount and color of the lochia is appropriate for the duration of recovery. Urinary output is adequate. The uncovered low transverse incision is healing well. Moderate amount of superficial bruising noted surrounding incision. The patient is ambulating well without assistance, tolerating a regular diet.     Disposition: home    Patient Instructions:   Current Discharge Medication  List        START taking these medications    Details   acetaminophen (TYLENOL) 325 MG tablet Take 3 tablets (975 mg) by mouth every 6 hours.  Qty: 60 tablet, Refills: 1    Associated Diagnoses: Pre-eclampsia superimposed on chronic hypertension, antepartum      ibuprofen (ADVIL/MOTRIN) 800 MG tablet Take 1 tablet (800 mg) by mouth every 6 hours.  Qty: 60 tablet, Refills: 0    Associated Diagnoses: Pre-eclampsia superimposed on chronic hypertension, antepartum      NIFEdipine ER (ADALAT CC) 60 MG 24 hr tablet Take 1 tablet (60 mg) by mouth daily.  Qty: 60 tablet, Refills: 0    Associated Diagnoses: Pre-eclampsia superimposed on chronic hypertension, antepartum      oxyCODONE (ROXICODONE) 5 MG tablet Take 1 tablet (5 mg) by mouth every 4 hours as needed for moderate to severe pain.  Qty: 10 tablet, Refills: 0    Associated Diagnoses: Pre-eclampsia superimposed on chronic hypertension, antepartum           CONTINUE these medications which have CHANGED    Details   labetalol (NORMODYNE) 300 MG tablet Take 2 tablets (600 mg) by mouth every 8 hours.  Qty: 60 tablet, Refills: 0    Associated Diagnoses: Pre-eclampsia superimposed on chronic hypertension, antepartum           CONTINUE these medications which have NOT CHANGED    Details   famotidine (PEPCID) 20 MG tablet Take 20 mg by mouth 2 times daily.      Prenatal Vit-Fe Fumarate-FA (PRENATAL MULTIVITAMIN  PLUS IRON) 27-1 MG TABS Take 1 tablet by mouth daily.           STOP taking these medications       aspirin 81 MG EC tablet Comments:   Reason for Stopping:             Activity: no heavy lifting for 2 weeks  Diet:  low sodium diet  Wound Care: keep wound clean and dry    Follow-up with [facility] in 2-3 days for BP check     Signed:  Rosendo Guzman MD  2/17/2025  12:39 PM

## 2025-02-17 NOTE — PROGRESS NOTES
Ultrasound tech reached out to this RN, notified that they are only able to do a ultrasound on breast if pt has a abscess, but unable to scan a mass and recommended referring to breast center. Information relayed to Sharonda GREEN. NORMA requested RN see if [facility] has a breast center that can do something inpatient. Plan discussed with charge nurse who stated we do not have a breast center inpatient but we have one across the street. CNM stated she will try and get an outpatient order in when able. Pt is upset with plan for breast and edema as she feels like nothing is being done. RN explained above and stated will infer for more information. Plan discussed with charge - RN discussed with radiology then MRI regarding if unable to do ultrasound would they recommend MRI; they stated a breast MRI is not typical and would require a breast MRI reader special request, if it was put in wouldn't be able to scan until eugene and most likely unable to read until earliest tomorrow, also that breastfeeding moms can cause a false positive. RN called clinic across the street and they stated they had some openings on Thursday but can only make appointment after provider puts in order. RN updated MD with above information - stated she is still figuring out how to place breast outpatient order but is working on it, and to refer to [facility] for edema. Dr. Guzman. Pt edema is 3+ and has discomfort. Pt had Lasix 20mg PO 2/16/25 @ 1947 and Lasix IV 20mg @ 0049 - asking if she would be okay adding another dose or what her recommendation would be - recommended elevating legs and increase protein diet, but would really just take time. Pt is elevating, ambulating, and voiding independently. Information relayed to pt - pt states she plans to discuss care with her primary provider as she waits for rounds.

## 2025-02-17 NOTE — PROGRESS NOTES
NOTE COPIED FROM 'S CHART:     INITIAL SOCIAL WORK NICU ASSESSMENT      DATA:      Reason for Social Work Consult: NICU admission     Presenting Information: SW met with pts parents, Sarah and Nathan, in Sarah's postpartum room.      Living Situation: Sarah and Nathan report living together. The pt is their first child.     Social Support: Sarah and Nathan report having strong social support. They report that Nathan's family is supportive and lives locally. They report that Sarah's family lives in [place] but are here now (parents and sister) and are able to return when needed to provide additional support.      Employment: Nathan reports that he has his own business and works full-time. He denies being able to take time off. Sarah reports that she is a  in [place] and is submitting her maternity leave paperwork. At this time she will have leave until mid-May and she reports plan to see where things are at closer to that time and then work on taking an extended leave if possible to get through the end of the school year.      Insurance: [facility] through Sarah's employment. She reports plan to add pt to this insurance and is in the process of making that contact.     Source of Financial Support: Employment. Ranjith deny financial concerns at baseline. They report having needed baby supplies.      Mental Health History: Sarah confirms a history of anxiety. She reports that this was treated with medication years ago but has been manageable since without treatment including during this pregnancy.      History of Postpartum Mood Disorders: NA     Chemical Health History: Chart reviewed. No tox screen sent on pt or baby.     INTERVENTION:      SW completed chart review and collaborated with the multidisciplinary team.   Psychosocial Assessment   Introduction to NICU  role and scope of practice   Discussed NICU experience and gave NICU  welcome card  Reviewed Hospital and Community Resources   Assessed Chemical Health History and Current Symptoms   Assessed Mental Health History and Current Symptoms   Identified stressors, barriers and family concerns   Provided support and active empathetic listening and validation.   Provided psychoeducation on  mood and anxiety disorders, assessed for any current symptoms or history     ASSESSMENT:      Coping: Sarah and Nathan appear to be coping appropriately with pts NICU admission. Sarah and Nathan both acknowledge that this unexpected early delivery was stressful and challenging. Nathan reports that he went out of town for work because they felt it would be safe for him to do so at this point but they also had Sarah's mom come into town to be with Sarah while he was gone and Sarah came into the hospital the day he flew out. Sarah also notes that the quick movement from knowing they were going to do a  to it happening has been a lot to process. SW provided space for parents to process their experience and provided supportive listening and validation of their feelings. Sarah intermittently tearful in discussing what has happened and her experience holding the pt yesterday. SW encouraged both parents to be gentle and gracious with themselves as they process this. Sarah identifies herself as someone who likes to have a plan and everything that she could prepare for she did, including getting the nursery set up, having all of the information she needed ready to request her leave and get pt on insurance, have her mom stay with her while Nathan was gone, but she acknowledges that a lot of what is happening is out of her control. SW validated this as well and encouraged Sarah to use her desire to plan to make a plan with things she can control at this time including making a plan for visiting the pt, making a plan for making sure she is taking care of herself including eating and  "sleeping. Sarah receptive to this. Nathan requests support regarding the transition for them to home without the pt. SW provided support and again encouraged basic self  care, utilizing their support system, and recognizing that their emotions about the situation are okay and valid, including sadness and grief. Sarah reports feeling good about pts care in the NICU.      Affect: calm , appropriate, tearful     Mood: \"somewhat sad, overwhelmed.\"     Motivation/Ability to Access Services: Parents appear able to access services as needed. No specific SW needs identified at this time but SW will continue to follow and assess for needs throughout NICU stay.      Assessment of Support System:  Strong     Level of engagement with SW: High     Family's understanding of baby's medical situation:  Strong- Nathan asked about long term effects to development for babies born early. SW provided general encouragement that the nursing and OT staff will be providing developmentally appropriate care and encourage family to do the same throughout the NICU stay. 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 Parents both report understanding of this. Nathan reports that he was born around 30-31 weeks also and was adopted soon after so is not completely sure of the timing of his delivery. He expresses being pleased with how well the pt doing at this time.      Family and parent/infant interactions: Parents appear supportive of each other. Parent/infant interactions not assessed at this time.     Assessment of parental risk for PMAD: Moderate given maternal history of anxiety and NICU admission. This increased risk was discussed with parents. SW provided education on postpartum mood concerns including when to seek help. Encouraged pt maintain close communication with her provider " regarding any mental health concerns and also discussed that SW can be available to provide support and connect to mental health resources during pts NICU stay if needed. Parents report understanding.     Strengths: financially stable, strong support system, stable employment, prepared for pt at home, open in processing experiences     Vulnerabilities:  NICU admission, first time parents, unexpected premature delivery, limited paternal leave from work     PLAN:    SW provided parent resources, SW NICU welcome information, and NICU support resources. Discussed plan for SW to follow and check in throughout NICU stay. Discussed how to reach SW in between check ins. Parents report understanding and deny further SW needs or questions at this time.        Ashleigh Quinn, ARGELIA

## 2025-02-17 NOTE — PLAN OF CARE
Problem: Adult Inpatient Plan of Care  Goal: Optimal Comfort and Wellbeing  Outcome: Progressing  Intervention: Monitor Pain and Promote Comfort  Recent Flowsheet Documentation  Taken 2/17/2025 0725 by Jodi Castano RN  Pain Management Interventions: cold applied  Intervention: Provide Person-Centered Care  Recent Flowsheet Documentation  Taken 2/17/2025 0800 by Jodi Castano RN  Trust Relationship/Rapport:   care explained   choices provided   emotional support provided   empathic listening provided   questions answered   questions encouraged   reassurance provided   thoughts/feelings acknowledged     Problem: Adult Inpatient Plan of Care  Goal: Readiness for Transition of Care  Outcome: Progressing     Goal Outcome Evaluation:      Plan of Care Reviewed With: patient, spouse    Overall Patient Progress: improvingOverall Patient Progress: improving    Outcome Evaluation: Pt vitals and assessments are WDL with exception of hypertension and 3+ edema (view progress note and provider notification note). Pt received oral lasix and increased dose of labetalol from 400mg TID to 600mgTID. Lactation was able to see patient and gave a hospital grade pump (view lactation note). Pt is pumping independently and bring milk independently to NICU. Plans to discharge today. Pt denies headache, visual changes, dizziness, and epigastric pain. Fundus firm without massage. Incision is approximated but bruised bilaterally. Pain has been adequately controlled by tylenol and ibuprofen. Spouse is at bedside and supportive of pt. Pt and spouse are visiting infant in NICU independently. Pt is pumping q2-3 hours. Patient is up ad wu. Tolerating fluids and foods - voiding without difficulty.

## 2025-02-17 NOTE — LACTATION NOTE
"This note was copied from a baby's chart.  Follow up NICU Lactation Visit    Gestational Age at Delivery:  30w2d     Corrected Gestational Age:  30w5d    Current Age:  3 days    Method of Feedings:   NPO    Breast Assessment: Breasts: Pendulous , Large, and Symmetrical, Nipples: Intact, small.  Note moderate interstitial fluid around base of nipple/entire areolar area.  Able to pump 21 ml EBM with the hospital grade breast pump (HGP), this morning.  Sarah and this writer did not discuss the growth in her breast at this visit.      Visit Summary:  Sarah has been using the Initiate program on the HGP and pumped 21 ml this morning.  Educated on reverse pressure softening and the \"breast lift\".  Instructed to assess the areolar tissue and use the techniques discussed to soften areolar tissue prior to pumping.  This writer notes Sarah has a large amount of edema in her lower extremities.  Bedside and provider aware.      Educated on the Maintain program and instructed to use this program after pumping 20+ ml per session, three times in a row.  She will switch to Maintain program this afternoon.      Educated on sanitizing her pump parts every 24 hours.  Instructed to call and request lactation, prn.  Sarah verbalizes understanding of all education given.  She denies any further questions.      Pumping Volume p/24hours:  21 ml total, this last pumping session.      Pump for home use: Other Wants to rent the HGP at hospital discharge.  Order needed.      Adjustments to Plan:  Bedside RN working on getting an order for follow up to assess Sarah's breast as an outpatient.      Education:  [] First drops kit  [] Benefits of breast milk  [] How breast milk is made  [x] Stages of milk production  [] Milk supply/goal volumes  [] Maternal risk factors that could affect milk supply   [] Hand expression  [] Collecting, labeling, transporting milk  [x] Cleaning, sanitizing pump parts  [] Storage of milk  [x] Importance of " pumping minimum of 8x in 24 hours   [x] Hands on Pumping   [] Hospital grade pump use and care  [] Initiate setting   [x] Maintain setting  [x] How to rent a hospital grade breast pump  [x] Engorgement-reverse pressure softening/ breast lift.   [] Increasing milk supply  [] Mastitis/Ductal narrowing  [] Weighted feeding  [] Nipple shield  [] Latch and positioning   [] Signs of milk transfer  [] Nuzzling  [] Review how to access lactation consultant prn   [] Outpatient Lactation  [] Feeding Plan for home

## 2025-02-17 NOTE — PLAN OF CARE
Problem: Adult Inpatient Plan of Care  Goal: Plan of Care Review  Description: The Plan of Care Review/Shift note should be completed every shift.  The Outcome Evaluation is a brief statement about your assessment that the patient is improving, declining, or no change.  This information will be displayed automatically on your shift  note.  Outcome: Progressing  Flowsheets (Taken 2025 0543)  Outcome Evaluation: VSS. BP intermittently 140 systolic. Minimal pain reported at incision site. She has been receiving scheduled tylenol and ibuprofen for pain management. Her fundus is firm with scant lochia. Incision site is open to air/intact with bruising at site. She has ongoing pitting lower extremity edema. Switched between compression stockings and SCDs. Denies Pre E symptoms. Lasix given x2 this shift, output steady but edema remains notable. She is pumping regularly. She has visited  in NICU this shift.  Plan of Care Reviewed With: patient  Overall Patient Progress: improving     Problem:  Delivery  Goal: Bleeding is Controlled  Outcome: Progressing  Goal: Absence of Infection Signs and Symptoms  Outcome: Progressing  Intervention: Prevent or Manage Infection  Recent Flowsheet Documentation  Taken 2025 194 by Erica Rubin, RN  Infection Prevention:   environmental surveillance performed   equipment surfaces disinfected   hand hygiene promoted   personal protective equipment utilized   rest/sleep promoted   single patient room provided   visitors restricted/screened  Goal: Effective Oxygenation and Ventilation  Outcome: Progressing   Goal Outcome Evaluation:      Plan of Care Reviewed With: patient    Overall Patient Progress: improvingOverall Patient Progress: improving    Outcome Evaluation: VSS. BP intermittently 140 systolic. Minimal pain reported at incision site. She has been receiving scheduled tylenol and ibuprofen for pain management. Her fundus is firm with scant lochia.  Incision site is open to air/intact with bruising at site. She has ongoing pitting lower extremity edema. Switched between compression stockings and SCDs. Denies Pre E symptoms. Lasix given x2 this shift, output steady but edema remains notable. She is pumping regularly. She has visited  in NICU this shift.

## 2025-02-18 ENCOUNTER — LACTATION ENCOUNTER (OUTPATIENT)
Dept: OTHER | Facility: HOSPITAL | Age: 35
End: 2025-02-18

## 2025-02-18 NOTE — PLAN OF CARE
Goal Outcome Evaluation: Ready for discharge.     Sarah's HR, RR, temp WNL; BP elevated; no treatable.  Rxs given for ordered home medications.  She verbalized understanding of how to take her meds.  Her pain is well controlled with Tylenol and Ibuprofen.  She's regularly ambulating to NICU to see her baby and pumping and bringing milk to baby as well.  Sarah was emotional about leaving her baby in hospital when she goes home; her  is supportive as is RN.  She rented a hospital grade breast pump and has a blood pressure cuff at home.    D:  Patient desires discharge home.  Discharge orders received and entered by provider.  A:  Discharge instructions reviewed with the patient.  All questions and concerns addressed.  Patient ambulated to NICU before leaving the hospital with her .  She left the building independently; car to home.

## 2025-02-18 NOTE — LACTATION NOTE
This note was copied from a baby's chart.  Follow up NICU Lactation Visit    Gestational Age at Delivery:  30w2d     Corrected Gestational Age:  30w6d    Current Age:  4 days    Visit Summary:  Met with Sarah in NICU, as she was doing skin to skin with infant.  Two female visitors also present.  States she has been pumping 20 ml each session.  Instructed to use Maintain program.  Sarah requested this writer instruct her visitors on the Maintain program, as they are helping out at home.  Sarah has a large amount of edema in her lower legs.  She states she has been using reverse pressure softening prior to pumping.  Encouraged to also use breast lift, prn.  Denies any other questions and looks forward to another LC visit in a couple days.      Pumping Volume p/24hours:  approximately 20 ml per session.     Pump for home use: Symphony     Adjustments to Plan:  Use Maintain program on pump.     Education:  [] First drops kit  [] Benefits of breast milk  [] How breast milk is made  [] Stages of milk production  [] Milk supply/goal volumes  [] Maternal risk factors that could affect milk supply   [] Hand expression  [] Collecting, labeling, transporting milk  [] Cleaning, sanitizing pump parts  [] Storage of milk  [] Importance of pumping minimum of 8x in 24 hours   [] Hands on Pumping   [] Hospital grade pump use and care  [] Initiate setting   [x] Maintain setting  [] How to rent a hospital grade breast pump  [] Engorgement  [] Increasing milk supply  [] Mastitis/Ductal narrowing  [] Weighted feeding  [] Nipple shield  [] Latch and positioning   [] Signs of milk transfer  [] Nuzzling  [] Review how to access lactation consultant prn   [] Outpatient Lactation  [] Feeding Plan for home

## 2025-02-20 ENCOUNTER — LACTATION ENCOUNTER (OUTPATIENT)
Dept: OTHER | Facility: HOSPITAL | Age: 35
End: 2025-02-20

## 2025-02-20 NOTE — LACTATION NOTE
"This note was copied from a baby's chart.  Follow up NICU Lactation Visit    Gestational Age at Delivery:  30w2d     Corrected Gestational Age:  31w1d    Current Age:  6 days    Method of Feedings:   NG    Breast Assessment: Breasts: Round and Full, Nipples: Everted and Intact    Hand Hugs/STS/Nuzzling/Latching/ Weighted feeds:  skin to skin    Visit Summary:  Sarah had not starting maintain mode yet. Watched a pump session and educated on maintain mode. Did not review how to press \"initiate button\" at end of pumping session as Sarah is feeling overall overwhelmed. Try a 5 hour stretch at night. Breasts are full and not engorged.     Sarah has not see the areola swelling reported happening twice in the first 24 hours. (See first and second note). She has an ultrasound and mammogram next week to follow up.     Pumping Volume p/24hours:  About 30-35ml/session 8x/day.    Pump for home use: Symphony     Adjustments to Plan:  Use maintain mode. One 5 hours stretch at night.     Education:  [] First drops kit  [] Benefits of breast milk  [] How breast milk is made  [] Stages of milk production  [x] Milk supply/goal volumes  [] Maternal risk factors that could affect milk supply   [] Hand expression  [] Collecting, labeling, transporting milk  [] Cleaning, sanitizing pump parts  [] Storage of milk  [x] Importance of pumping minimum of 8x in 24 hours   [] Hands on Pumping   [] Hospital grade pump use and care  [] Initiate setting   [x] Maintain setting  [] How to rent a hospital grade breast pump  [] Engorgement  [] Increasing milk supply  [] Mastitis/Ductal narrowing  [] Weighted feeding  [] Nipple shield  [] Latch and positioning   [] Signs of milk transfer  [] Nuzzling  [x] Review how to access lactation consultant prn   [] Outpatient Lactation  [] Feeding Plan for home  "

## 2025-02-23 ENCOUNTER — LACTATION ENCOUNTER (OUTPATIENT)
Dept: OTHER | Facility: HOSPITAL | Age: 35
End: 2025-02-23

## 2025-02-23 NOTE — LACTATION NOTE
This note was copied from a baby's chart.  Follow up NICU Lactation Visit    Gestational Age at Delivery:  30w2d     Corrected Gestational Age:  31w4d    Current Age:  9 day old    Method of Feedings:   NG    Hand Hugs/STS/Nuzzling/Latching/ Weighted feeds:  STS    Visit Summary:  Maintain mode is working well. Denies breast or nipple pain. The right breast is producing twice that of the left breast. (Right 40ml, Left 20 ml.) She has an ultrasound  and mammogram pending this week for evaluation.    Pumping Volume p/24hours:  60ml every 3 hours/24 hours    Pump for home use: Symphony     Adjustments to Plan:  Instructed to pump prior to mammogram for best imaging results.    Education:  [] First drops kit  [] Benefits of breast milk  [] How breast milk is made  [x] Stages of milk production  [x] Milk supply/goal volumes  [] Maternal risk factors that could affect milk supply   [] Hand expression  [] Collecting, labeling, transporting milk  [] Cleaning, sanitizing pump parts  [] Storage of milk  [x] Importance of pumping minimum of 8x in 24 hours   [x] Hands on Pumping   [] Hospital grade pump use and care  [] Initiate setting   [x] Maintain setting  [] How to rent a hospital grade breast pump  [] Engorgement  [] Increasing milk supply  [] Mastitis/Ductal narrowing  [] Weighted feeding  [] Nipple shield  [] Latch and positioning   [] Signs of milk transfer  [] Nuzzling  [x] Review how to access lactation consultant prn   [] Outpatient Lactation  [] Feeding Plan for home

## 2025-02-25 ENCOUNTER — ANCILLARY PROCEDURE (OUTPATIENT)
Dept: MAMMOGRAPHY | Facility: CLINIC | Age: 35
End: 2025-02-25
Payer: COMMERCIAL

## 2025-02-25 PROCEDURE — 77066 DX MAMMO INCL CAD BI: CPT

## 2025-02-25 PROCEDURE — 76642 ULTRASOUND BREAST LIMITED: CPT | Mod: LT

## 2025-02-26 ENCOUNTER — LACTATION ENCOUNTER (OUTPATIENT)
Dept: OTHER | Facility: HOSPITAL | Age: 35
End: 2025-02-26

## 2025-02-26 NOTE — LACTATION NOTE
"This note was copied from a baby's chart.  Follow up NICU Lactation Visit    Gestational Age at Delivery:  30w2d     Corrected Gestational Age:  32w0d    Current Age:  12 days    Method of Feedings:   NG    Breast Assessment: Did not assess, reports soft and no pain during or between pumping    Hand Hugs/STS/Nuzzling/Latching/ Weighted feeds:  STS, Reviewed premature feeding progression    Visit Summary:  Sarah had MRI and mammogram yesterday. A lump was found on her left breast at about 9-10 o'clock. She is unable to palpate the lump. She has a biopsy tomorrow morning and had questions about lidocaine use. Provided information from [facility] regarding lidocaine. Pump prior to biopsy, do not dump pumped milk.      Medications:      Dr. Martinez's \"Lactation Risk Categories\"  categories according to [employer] Medications and Mothers' Milk, 2023 by Suman Martinez.     L2 Probably Compatible:   Drug which has been studied in a limited number of breastfeeding women without an increase in adverse effects in the infant. And/or, the evidence of a demonstrated risk which is likely to follow use of this medication in a breastfeeding woman is remote.      Relevant medications reviewed, Martinez Category and infant monitoring considerations included:   Lidocaine L2 No infant monitoring noted    Pumping Volume p/24hours:  Volumes increasing, pumping 120ml this am. /session 8x/24 hours. One 5 hour stretch    Pump for home use: Symphony     Adjustments to Plan:  Pump prior to biopsy.     Education:  [] First drops kit  [] Benefits of breast milk  [] How breast milk is made  [] Stages of milk production  [] Milk supply/goal volumes  [] Maternal risk factors that could affect milk supply   [] Hand expression  [] Collecting, labeling, transporting milk  [] Cleaning, sanitizing pump parts  [] Storage of milk  [x] Importance of pumping minimum of 8x in 24 hours   [] Hands on Pumping   [] Hospital grade pump use and care  [] Initiate setting "   [] Maintain setting  [] How to rent a hospital grade breast pump  [] Engorgement  [] Increasing milk supply  [] Mastitis/Ductal narrowing  [] Weighted feeding  [] Nipple shield  [] Latch and positioning   [] Signs of milk transfer  [] Nuzzling  [x] Review how to access lactation consultant prn   [] Outpatient Lactation  [] Feeding Plan for home

## 2025-02-27 ENCOUNTER — ANCILLARY PROCEDURE (OUTPATIENT)
Dept: MAMMOGRAPHY | Facility: CLINIC | Age: 35
End: 2025-02-27
Payer: COMMERCIAL

## 2025-02-27 ENCOUNTER — ANCILLARY PROCEDURE (OUTPATIENT)
Dept: MAMMOGRAPHY | Facility: CLINIC | Age: 35
End: 2025-02-27
Attending: OBSTETRICS & GYNECOLOGY
Payer: COMMERCIAL

## 2025-02-27 DIAGNOSIS — N63.20 LEFT BREAST MASS: ICD-10-CM

## 2025-02-27 PROCEDURE — 250N000009 HC RX 250: Performed by: OBSTETRICS & GYNECOLOGY

## 2025-02-27 PROCEDURE — 999N000065 MA POST PROCEDURE LEFT

## 2025-02-27 PROCEDURE — 88305 TISSUE EXAM BY PATHOLOGIST: CPT | Mod: TC | Performed by: OBSTETRICS & GYNECOLOGY

## 2025-02-27 PROCEDURE — 272N000717 US BREAST BIOPSY CORE NEEDLE LEFT

## 2025-02-27 PROCEDURE — A4648 IMPLANTABLE TISSUE MARKER: HCPCS

## 2025-02-27 RX ORDER — LABETALOL 200 MG/1
200 TABLET, FILM COATED ORAL 2 TIMES DAILY
COMMUNITY
Start: 2024-12-10 | End: 2025-02-27

## 2025-02-27 RX ADMIN — LIDOCAINE HYDROCHLORIDE 10 ML: 10 INJECTION, SOLUTION INFILTRATION; PERINEURAL at 08:13

## 2025-02-27 NOTE — DISCHARGE INSTRUCTIONS
After Your Breast Biopsy    Bleeding, bruising, and pain  Breast tenderness and some bruising is normal and may last several days. You may wear your bra overnight to support the breast.  You may use an ice pack for pain. Place it over the area for 15 to 20 minutes, several times a day.  You may take over-the-counter pain medicine:  On the day of the biopsy, we recommend Tylenol (acetaminophen) because it does not raise your risk of bleeding.  The next day, you may take an anti-inflammatory medicine (aspirin, ibuprofen, Motrin, Aleve, Advil), unless your doctor tells you not to.  Bandages and showering  Keep your bandage in place until tomorrow morning. Don't get it wet.  If you have small pieces of tape on the skin, leave them in place. They will fall off on their own, or you can remove them after 5 days.  You may shower the next morning after your biopsy.  Activity  No heavy activity (no running, no gym workouts, no lifting, no vacuuming, etc.) on the day of your biopsy.  You may go back to normal activity the next day. But limit what you do if you still have pain or discomfort.  Infection  Infection is rare. Signs of infection include:  Fever (including sweats and chills)  Redness  Pain that gets worse  Fluid draining from the biopsy site  Biopsy results  Results may take up to 5 business days.  A nurse or doctor from the Breast Center will call with your results. The system sends the results to the doctor that ordered your biopsy & MyChart automatically.     If you have not gotten your results in 5 days, please call the Breast Center.  Call the Breast Center with questions or if:   You have bleeding that lasts more than 20 minutes.  You have pain that you can't control.  You have signs of infection (fever, sweats, chills, redness, increasing pain, or drainage).  After hours, please call the doctor who ordered your biopsy.     Breast Center Nurse  [phone]    For informational purposes only. Not to replace the  advice of your health care provider. Copyright   2010 [employer] [facility]. All rights reserved. Clinically reviewed by Katie Sorto, Director, [facility]. [employer] [id number] - REV 08/23.

## 2025-02-28 LAB
PATH REPORT.COMMENTS IMP SPEC: NORMAL
PATH REPORT.FINAL DX SPEC: NORMAL
PATH REPORT.GROSS SPEC: NORMAL
PATH REPORT.MICROSCOPIC SPEC OTHER STN: NORMAL
PHOTO IMAGE: NORMAL

## 2025-02-28 PROCEDURE — 88305 TISSUE EXAM BY PATHOLOGIST: CPT | Mod: 26 | Performed by: PATHOLOGY

## 2025-03-03 ENCOUNTER — TELEPHONE (OUTPATIENT)
Dept: MAMMOGRAPHY | Facility: CLINIC | Age: 35
End: 2025-03-03
Payer: COMMERCIAL

## 2025-03-03 ENCOUNTER — LACTATION ENCOUNTER (OUTPATIENT)
Dept: OTHER | Facility: HOSPITAL | Age: 35
End: 2025-03-03

## 2025-03-03 NOTE — TELEPHONE ENCOUNTER
At the request of the Breast Center Radiologist, Dr. Osborne,  I phoned patient and discussed the benign breast biopsy results.      Patient is advised a 6 month follow up left breast ultrasound is recommended.   Written order requested from Dr. Andersen.      Patient denies any concerns at her biopsy site. Questions were answered and my phone number given if she has further questions or concerns.  Ordering provider was informed of these results.  Patient verbalized understanding and agrees with the plan of care.        Jessica Olsen, RN, BSN, PHN, CBCN  Breast Center Imaging Nurse Coordinator   [facility]  [address]  [place] [address]  [phone]

## 2025-03-03 NOTE — LACTATION NOTE
This note was copied from a baby's chart.  Follow up NICU Lactation Visit    Gestational Age at Delivery:  30w2d     Corrected Gestational Age:  32w5d    Current Age:  2 weeks    Method of Feedings:   NG    Hand Hugs/STS/Nuzzling/Latching/ Weighted feeds:  On 2L HFNC, could start nuzzling - plan to check in tomorrow.     Visit Summary:  Sarah asking what she can do to increase milk supply. She has not noticed a change now in about a week. Reviewed the following. Marjorie plans to try a couple days of power pumping.     Increasing Milk Supply         Power Pumping (done on both breasts at the same time)   Pump a couple hours in a row   Pump for 20 minutes, rest for 20 minutes, pump for 10 minutes, rest for 10 minute, pump for 10 minutes     Pumping Tips     Relaxing music (care channel in hospital)   Meditation ([place] [employer], meditation apps like [employer])   Progressive muscle relaxation   Calming smells like lavender, candles or other comforting smell   Drink calming tea, sucking hard candy   Watch video camera or videos of baby crying   Clothing or blanket that smells like baby   Placing light weight blanket over pump bottles     Adequate hydration and nutrition     There are no specific foods evidence shows to increase milk supply   Ensure you are eating to hunger and getting about 600 calories extra a day while pumping and breastfeeding   Drink to thirst, drink water every time you breastfeed or pump      Pumping Volume p/24hours:   (most pumps about 80), slightly more on R, 665ml yesterday.     Pump for home use: Symphony     Adjustments to Plan:  Try power pumping for 2-3 days. Could start nuzzling tomorrow.     Education:  [] First drops kit  [] Benefits of breast milk  [] How breast milk is made  [] Stages of milk production  [] Milk supply/goal volumes  [] Maternal risk factors that could affect milk supply   [] Hand expression  [] Collecting, labeling, transporting milk  [] Cleaning, sanitizing pump  parts  [] Storage of milk  [] Importance of pumping minimum of 8x in 24 hours   [] Hands on Pumping   [] Hospital grade pump use and care  [] Initiate setting   [] Maintain setting  [] How to rent a hospital grade breast pump  [] Engorgement  [x] Increasing milk supply  [] Mastitis/Ductal narrowing  [] Weighted feeding  [] Nipple shield  [] Latch and positioning   [] Signs of milk transfer  [x] Nuzzling  [x] Review how to access lactation consultant prn   [] Outpatient Lactation  [] Feeding Plan for home

## 2025-03-06 ENCOUNTER — LACTATION ENCOUNTER (OUTPATIENT)
Dept: OTHER | Facility: HOSPITAL | Age: 35
End: 2025-03-06

## 2025-03-06 NOTE — LACTATION NOTE
Patient ID: Ellen is a 63 year old female.    Chief Complaint   Patient presents with   • Follow-up   • Diabetes       Accompanied by her family - daughter and niece     Presented for a f/u on chronic med conditions    Underwent  L eye vitrectomy on 5/10/2021 with improvement of vision    DM  Reports frequent blood sugars   BG log is not available, but states at least 4 times a week feels dizzy, low BG 40-50, at different times of the day   appettite - normal, eats well    In am BG in the range of     CAD, s/p CABG 2011  Seen by cardiology  Underwent Nuclear stress test - hypokinesis, but stable, EF 33 %  Asymptomatic    CKD  Pre-op labs - worsening of creatinine noted    Chronic anemia, on PO iron, seen by GI, EGD, colonoscopy advised, but still not done    Ling nodule - scheduled for thoracic Sx reeval with CT chest f/u    Continues to smoke, would like to discuss strategies and Tx to help quitting    C/o difficulties walking due to LE pains (h/o PVD), foot deformity due to multiple surgeries  Requesting disability parking form      Ellen Hughes does not have any pertinent problems on file.  Ellen  has a past surgical history that includes Coronary artery bypass graft; Cataract extraction, bilateral; Cholecystectomy; Mitral valve repair (2011); Atrial Septal Defect Closure (2011); and Foot surgery (Right).  Her family history includes Coronary Artery Disease in her father and sister.  Ellen Hughes   Outpatient Current Medications as of as of 6/10/2021       Disp Refills Start End    furosemide (LASIX) 40 MG tablet (Taking) 90 tablet 0 6/10/2021     Sig - Route: Take 0.5 tablets by mouth every morning. - Oral    Class: Eprescribe    cilostazol (PLETAL) 100 MG tablet (Taking) 180 tablet 3 6/3/2021     Sig - Route: Take 1 tablet by mouth 2 times daily. - Oral    Class: Eprescribe    Insulin Pen Needle (Easy Comfort Pen Needles) 33G X 4 MM Misc (Taking) 400 each 3 6/3/2021     Sig: Inject insulin 4 times daily  This note was copied from a baby's chart.  Follow up NICU Lactation Visit    Gestational Age at Delivery: 30w2d     Corrected Gestational Age: 33w1d    Current Age: 20 do     Method of Feedings: NG started to nuzzle      Breastfeeding goals:  undecided     Breast Assessment: Breasts: Round, Large, and Soft , Nipples: Everted with stimulation short     Hand Hugs/STS/Nuzzling/Latching/ Weighted feeds: Started nuzzling this feeding     Feeding Assessment:  Infant was brought to an empty breast in cross cradle hold, he mouthed breast and placed mouth over nipple but did not suckle.  Mom was able to hand express and infant did lick breastmilk off nipple.   Encouraged mom to let baby nuzzle on an empty breast if he is awake for feedings and rooting.         Adjustments to Plan: Welcome nuzzling on empty breast if infant is a willing partner, awake for feedings and rooting.         Class: Eprescribe    GNP Stool Softener/Laxative 8.6-50 MG per tablet (Taking) 90 tablet 3 6/3/2021     Sig: TAKE 1 TABLET BY MOUTH DAILY.    Class: Eprescribe    traMADol (ULTRAM) 50 MG tablet (Taking) 30 tablet 1 5/10/2021     Sig - Route: TAKE 0.5-1 TABLETS BY MOUTH EVERY 6 HOURS AS NEEDED FOR PAIN (TAKE WITH TYLENOL (GENERIC) 500 MG). - Oral    Class: Eprescribe    gabapentin (NEURONTIN) 300 MG capsule (Taking) 60 capsule 3 5/5/2021     Sig - Route: TAKE 1 CAPSULE BY MOUTH 2 TIMES DAILY. - Oral    Class: Eprescribe    Alcohol Swabs (Easy Comfort Alcohol Pads) Pads (Taking) 400 each 1 5/5/2021     Sig: TEST 3-4 TIMES DAILY AND USE 4 TIMES WITH INSULIN    Class: Eprescribe    OneTouch Ultra test strip (Taking) 200 each 2 4/9/2021     Sig: USE TO TEST 3 TO 4 TIMES DAILY 3-4 TIMES DAILY DX CODE: E11.00    Class: Eprescribe    SAPS Twist Top Lancets Misc (Taking) 200 each 2 4/9/2021     Sig: USE TO TEST 3 TO 4 TIMES DAILY 3-4 TIMES DAILY DX CODE: E11.00    Class: Eprescribe    Blood Glucose Monitoring Suppl (ONE TOUCH ULTRA 2) w/Device Kit (Taking) 1 kit 0 4/9/2021     Sig: USE TO TEST 3 TO 4 TIMES DAILY    Class: Eprescribe    Symbicort 160-4.5 MCG/ACT inhaler (Taking) 10.2 g 1 4/9/2021     Sig: INHALE 2 PUFFS INTO THE LUNGS 2 TIMES DAILY.    Class: Eprescribe    Basaglar KwikPen 100 UNIT/ML pen-injector (Taking) 30 mL 1 3/15/2021     Sig: INJECT 30 UNITS UNDER THE SKIN DAILY    Class: Eprescribe    simvastatin (ZOCOR) 20 MG tablet (Taking) 90 tablet 1 2/9/2021     Sig: TAKE ONE TABLET BY MOUTH DAILY    Class: Eprescribe    metoPROLOL tartrate (LOPRESSOR) 25 MG tablet (Taking) 180 tablet 1 2/9/2021     Sig: TAKE ONE TABLET BY MOUTH TWICE DAILY    Class: Eprescribe    isosorbide mononitrate (IMDUR) 30 MG 24 hr tablet (Taking) 90 tablet 1 2/9/2021     Sig - Route: TAKE 1 TABLET BY MOUTH DAILY. - Oral    Class: Eprescribe    aspirin (Aspirin Low Dose) 81 MG EC tablet (Taking) 90 tablet 1 2/9/2021     Sig: TAKE ONE  TABLET BY MOUTH ONCE DAILY .    Class: Eprescribe    FeroSul 325 (65 Fe) MG tablet (Taking) 90 tablet 1 2/9/2021     Sig: TAKE 1 TABLET BY MOUTH DAILY (WITH BREAKFAST).    Class: Eprescribe    Cyanocobalamin (Vitamin B12) 1000 MCG Tab CR (Taking)   11/12/2020     Sig - Route: Take 1,000 mcg by mouth daily. - Oral    Class: Historical Med    Insulin Lispro, 1 Unit Dial, (HumaLOG KwikPen) 100 UNIT/ML pen-injector (Taking) 15 mL 3 10/26/2020     Sig - Route: Inject 5 Units into the skin 3 times daily (before meals). - Subcutaneous    Class: Eprescribe    diclofenac (VOLTAREN) 1 % gel (Taking) 300 g 3 10/26/2020     Sig - Route: Apply 4 g topically 4 times daily. Apply to the affected joints - Topical    Class: Eprescribe    lidocaine (XYLOCAINE) 5 % ointment (Taking) 150 g 5 4/30/2020     Sig: Apply to affected area BID    Class: Eprescribe    fluticasone (FLONASE) 50 MCG/ACT nasal spray (Taking) 16 g 3 1/6/2020     Sig - Route: Spray 1 spray in each nostril 2 times daily. - Nasal    Class: Eprescribe    albuterol 108 (90 Base) MCG/ACT inhaler (Taking) 8.5 g 2 10/11/2019     Sig: INHALE 2 PUFFS INTO THE LUNGS EVERY 4 HOURS AS NEEDED FOR SHORTNESS OF BREATH OR WHEEZING.    Class: Eprescribe    Insulin Pen Needle (PEN NEEDLES 3/16\") 31G X 5 MM Misc (Taking)   6/25/2019     Sig: As Instructed    Class: Historical Med    EASY COMFORT INSULIN SYRINGE 30G X 5/16\" 0.5 ML Misc (Taking) 100 each 0 1/9/2019     Sig: INJECT ONCE DAILY    Class: Eprescribe    citalopram (CELEXA) 20 MG tablet (Taking)        Sig - Route: Take 20 mg by mouth daily.  - Oral    Class: Historical Med    glyBURIDE (DIABETA) 5 MG tablet 180 tablet 1 6/10/2021     Sig - Route: Take 1 tablet by mouth 2 times daily (with meals). - Oral    Class: Eprescribe    buPROPion XL (Wellbutrin XL) 150 MG 24 hr tablet 30 tablet 1 6/10/2021     Sig - Route: Take 1 tablet by mouth daily. - Oral    Class: Eprespranav Hughes has No Known Allergies.    Review of  Systems   Constitutional: Positive for fatigue.   HENT: Negative.    Eyes: Negative.    Respiratory: Negative.    Cardiovascular: Negative.    Gastrointestinal: Negative.    Endocrine: Negative.    Genitourinary: Negative.    Musculoskeletal: Positive for arthralgias and back pain.   Skin: Negative.    Neurological: Negative.    Hematological: Negative.    Psychiatric/Behavioral: Positive for dysphoric mood.       Vitals:    06/10/21 1112   BP: 133/66   Pulse: 89   Temp: 97.8 °F (36.6 °C)   SpO2: 96%   Weight: 75.2 kg (165 lb 12.8 oz)   Height: 5' 2\" (1.575 m)   PainSc: 2        PHYSICAL EXAM      General appearance: alert, well developed, in no acute distress, vital signs reviewed      Head and Face: atraumatic, no deformities, normocephalic, normal facies      Eyes: normal conjunctivae, sclera, no periorbital edema, EOMI, BOB B/L      Neck: supple, no thyroid enlargement, no palpable thyroid nodules  Lymphatic: no lymphadenopathy. NO wheezing  Pulmonary:  clear to auscultation bilaterally.   Cardiovascular: RRR, no edema B/L LE  Abdomen: soft, nontender, nondistended, normal bowel sounds and no abdominal mass. no hepatomegaly and no splenomegaly. no umbilical hernia was discovered.   Neurologic: cranial nerves grossly intact. no sensory deficits noted. no coordination deficits.  muscle strength and tone were normal. DTR's normal and symmetrical bilaterally   Musculoskeletal: lumbar spine- decreased ROM due to pain   Hematologic: no excessive bruising noted  SKIN: normal turgor, no skin rashes  Psychiatric: oriented to time, self and place  RT foot deformities due to multiple surgeries          Ellen was seen today for follow-up and diabetes.    Diagnoses and all orders for this visit:    Type 2 diabetes mellitus with hyperosmolarity without coma, with long-term current use of insulin (CMS/HCC)  -     CBC WITH DIFFERENTIAL; Future  -     COMPREHENSIVE METABOLIC PANEL; Future  -     IRON AND TOTAL IRON BINDING  CAPACITY; Future  -     FERRITIN; Future  -     GLYCOHEMOGLOBIN; Future  -     LIPID PANEL WITH REFLEX; Future  -     THYROID STIMULATING HORMONE REFLEX; Future  -     VITAMIN D -25 HYDROXY; Future  -     MICROALBUMIN URINE RANDOM; Future  -     URINALYSIS WITH MICROSCOPY & CULTURE IF INDICATED; Future    Lung nodule  -     CBC WITH DIFFERENTIAL; Future  -     COMPREHENSIVE METABOLIC PANEL; Future  -     IRON AND TOTAL IRON BINDING CAPACITY; Future  -     FERRITIN; Future  -     GLYCOHEMOGLOBIN; Future  -     LIPID PANEL WITH REFLEX; Future  -     THYROID STIMULATING HORMONE REFLEX; Future  -     VITAMIN D -25 HYDROXY; Future  -     MICROALBUMIN URINE RANDOM; Future  -     URINALYSIS WITH MICROSCOPY & CULTURE IF INDICATED; Future    Atherosclerosis of native coronary artery of native heart without angina pectoris  -     CBC WITH DIFFERENTIAL; Future  -     COMPREHENSIVE METABOLIC PANEL; Future  -     IRON AND TOTAL IRON BINDING CAPACITY; Future  -     FERRITIN; Future  -     GLYCOHEMOGLOBIN; Future  -     LIPID PANEL WITH REFLEX; Future  -     THYROID STIMULATING HORMONE REFLEX; Future  -     VITAMIN D -25 HYDROXY; Future  -     MICROALBUMIN URINE RANDOM; Future  -     URINALYSIS WITH MICROSCOPY & CULTURE IF INDICATED; Future    Benign essential hypertension  -     CBC WITH DIFFERENTIAL; Future  -     COMPREHENSIVE METABOLIC PANEL; Future  -     IRON AND TOTAL IRON BINDING CAPACITY; Future  -     FERRITIN; Future  -     GLYCOHEMOGLOBIN; Future  -     LIPID PANEL WITH REFLEX; Future  -     THYROID STIMULATING HORMONE REFLEX; Future  -     VITAMIN D -25 HYDROXY; Future  -     MICROALBUMIN URINE RANDOM; Future  -     URINALYSIS WITH MICROSCOPY & CULTURE IF INDICATED; Future    Iron deficiency anemia, unspecified iron deficiency anemia type  -     CBC WITH DIFFERENTIAL; Future  -     COMPREHENSIVE METABOLIC PANEL; Future  -     IRON AND TOTAL IRON BINDING CAPACITY; Future  -     FERRITIN; Future  -     GLYCOHEMOGLOBIN;  Future  -     LIPID PANEL WITH REFLEX; Future  -     THYROID STIMULATING HORMONE REFLEX; Future  -     VITAMIN D -25 HYDROXY; Future  -     MICROALBUMIN URINE RANDOM; Future  -     URINALYSIS WITH MICROSCOPY & CULTURE IF INDICATED; Future    Current mild episode of major depressive disorder, unspecified whether recurrent (CMS/HCC)    Ischemic cardiomyopathy    Type 2 diabetes mellitus with diabetic nephropathy, with long-term current use of insulin (CMS/HCC)    PVD (peripheral vascular disease) (CMS/HCC)    Tobacco use disorder    Other orders  -     furosemide (LASIX) 40 MG tablet; Take 0.5 tablets by mouth every morning.  -     glyBURIDE (DIABETA) 5 MG tablet; Take 1 tablet by mouth 2 times daily (with meals).  -     buPROPion XL (Wellbutrin XL) 150 MG 24 hr tablet; Take 1 tablet by mouth daily.        Benign essential hypertension  Hypertension is stable on metoprolol   Cont the same.  Not on ACEI/ARB due to wosrening of CKD  Considering multiple med conditions (CAD, HTN, DN, CKD - p-t will benefit from ACEI with close monitoring of kidneys function and K level,   but due to noncompliance with recommendations, will postpone initiation until seen by nephro    Atherosclerotic heart disease of native coronary artery without angina pectoris  Asymptomatic  Cardiology f/u  Cont ASA, statin  Cardiology f/u    Ischemic cardiomyopathy  EF 33 % on recent stress test 05/2021  Clinically euvolemic   Due to worsening of GFR - decrease dose of furosemide to 20 mg daily and monitor for any signs of decompensation  Cont Low Na diet, fluid restriction and daily weight    Type 2 diabetes mellitus with hyperosmolarity without coma, with long-term current use of insulin (CMS/[facility])  P-t and her family report frequent episodes of low BG - BG log is not available, not able to adjust insulin dose  Considering worsening of GFR - will stop Metformin  decrease glyburide to 5 mg PO BID, cont the same dose of insulin  Continue to check BG 3-4  times a day, bring BG log to the next visit in 3 weeks    Iron deficiency anemia  Cont PO Iron  No signs of bleeding reported  Labs  EGD and colonoscopy is still advised    Diabetes mellitus with diabetic nephropathy (CMS/HCC)  Worsenign of GFR noted  meds adjusted  Labs in 2-3 weeks prior next visit  Nephrology evaluation is advised    PVD (peripheral vascular disease) (CMS/HCC)  Cont pletal  Cont regular walking (but limited due to pain)        Depression  Psychological condition is stable, cont citalopram  Denies any SI/HI, family is supportive    Tobacco use disorder  Smoking cessation advise given  Not a candidate for Chantix due to depression  Will start Wellbutrin   reeval next visit    Disability parking form filled out    F/u in 3 weeks  Labs prior next visit      Patient Care Team:  Soni Ventura MD as PCP - General (Internal Medicine)  Rip Vazquez MD as Ophthalmology (Ophthalmology)  Ruiz Guerrero MD as Referring Provider (Cardiovascular Disease)  Lance Abarca MD (Ophthalmology Retina Specialist)    Soni Ventura MD

## 2025-03-09 ENCOUNTER — LACTATION ENCOUNTER (OUTPATIENT)
Dept: OTHER | Facility: HOSPITAL | Age: 35
End: 2025-03-09

## 2025-03-09 NOTE — LACTATION NOTE
This note was copied from a baby's chart.  Follow up NICU Lactation Visit    Gestational Age at Delivery:  30w2d     Corrected Gestational Age:  33w4d    Current Age:  3 weeks    Method of Feedings:   NG    Hand Hugs/STS/Nuzzling/Latching/ Weighted feeds:  Nuzzling     Feeding Assessment:  Bedside RN report assisting with nuzzling. Short nipples and will likely benefit from nipple shield.     Visit Summary:  Sarah has been power pumping for about a week with no change in volume. She would like to try galactogues at this point. Handout from physican's guide to breastfeeding previously provided and Sarah plans to reach out to provider. Likely to try [place] based galactogue.    Pumping Volume p/24hours:  8x/day 80ml x 7 and 120 in the am ~680/day.     Pump for home use: Symphony     Adjustments to Plan:  Continue working on nuzzling. Call for support at any time. Start galactogues.     Education:  [] First drops kit  [] Benefits of breast milk  [] How breast milk is made  [] Stages of milk production  [x] Milk supply/goal volumes  [] Maternal risk factors that could affect milk supply   [] Hand expression  [] Collecting, labeling, transporting milk  [] Cleaning, sanitizing pump parts  [] Storage of milk  [x] Importance of pumping minimum of 8x in 24 hours   [] Hands on Pumping   [] Hospital grade pump use and care  [] Initiate setting   [] Maintain setting  [] How to rent a hospital grade breast pump  [] Engorgement  [x] Increasing milk supply  [] Mastitis/Ductal narrowing  [] Weighted feeding  [] Nipple shield  [] Latch and positioning   [] Signs of milk transfer  [x] Nuzzling  [x] Review how to access lactation consultant prn   [] Outpatient Lactation  [] Feeding Plan for home

## 2025-03-10 PROCEDURE — 88307 TISSUE EXAM BY PATHOLOGIST: CPT | Mod: 26 | Performed by: PATHOLOGY

## 2025-03-11 ENCOUNTER — LACTATION ENCOUNTER (OUTPATIENT)
Dept: OTHER | Facility: HOSPITAL | Age: 35
End: 2025-03-11

## 2025-03-11 NOTE — LACTATION NOTE
This note was copied from a baby's chart.  Follow up NICU Lactation Visit    Gestational Age at Delivery: 30w2d     Corrected Gestational Age: 33w6d    Current Age: 25do    Method of Feedings: NG     Hand Hugs/STS/Nuzzling/Latching/ Weighted feeds: increased spells needing stim, pausing nuzzling at this time    Visit Summary: Mother checking in with her provider if galactogue is safe with her meds as she has ordered it.     Pumping Volume p/24hours: no changes    Pump for home use: Symphony     Adjustments to Plan: No changes today    Education:    [x] Review how to access lactation consultant prn

## 2025-03-18 ENCOUNTER — OFFICE VISIT (OUTPATIENT)
Dept: CARDIOLOGY | Facility: CLINIC | Age: 35
End: 2025-03-18
Payer: COMMERCIAL

## 2025-03-18 VITALS
HEIGHT: 64 IN | DIASTOLIC BLOOD PRESSURE: 72 MMHG | BODY MASS INDEX: 39.16 KG/M2 | HEART RATE: 80 BPM | RESPIRATION RATE: 16 BRPM | WEIGHT: 229.4 LBS | SYSTOLIC BLOOD PRESSURE: 130 MMHG

## 2025-03-18 LAB — NT-PROBNP SERPL-MCNC: 70 PG/ML (ref 0–450)

## 2025-03-18 PROCEDURE — 3075F SYST BP GE 130 - 139MM HG: CPT | Performed by: INTERNAL MEDICINE

## 2025-03-18 PROCEDURE — 3078F DIAST BP <80 MM HG: CPT | Performed by: INTERNAL MEDICINE

## 2025-03-18 PROCEDURE — 99204 OFFICE O/P NEW MOD 45 MIN: CPT | Performed by: INTERNAL MEDICINE

## 2025-03-18 PROCEDURE — 83880 ASSAY OF NATRIURETIC PEPTIDE: CPT | Performed by: INTERNAL MEDICINE

## 2025-03-18 PROCEDURE — 36415 COLL VENOUS BLD VENIPUNCTURE: CPT | Performed by: INTERNAL MEDICINE

## 2025-03-18 NOTE — LETTER
3/18/2025    Nithya Andersen MD  [address] [place] [address]    RE: Sarah Parada       Dear Colleague,     I had the pleasure of seeing Sarah Parada in the [facility].    HEART CARE ENCOUNTER CONSULTATON NOTE      [facility]  [phone]      Assessment/Recommendations   Assessment:  1.  Hypertension: Pregnancy complicated by gestational hypertension and severe preeclampsia.  She was initially quite volume overloaded and it has gradually improved but she still has significant edema on exam, mainly pedal.  Blood pressure is currently well-controlled on labetalol and nifedipine.  Plan:  1.  Recommend continue on current medications, labetalol 200 mg twice daily and nifedipine XL 60 mg daily  2.  Recommend good hydration, low-sodium intake and regular exercise  3.  Echocardiogram and will check a BNP today         History of Present Illness/Subjective    HPI: Sarah Parada is a 35 year old female with history of recent pregnancy complicated by preeclampsia and gestational hypertension who I am seeing today for initial consultation.  She had gestational hypertension and was managed with labetalol.  She was admitted on 2025 at 30 weeks gestational age with elevated blood pressures and found to have severe preeclampsia and eventually underwent .  Her son is still in the NICU at [facility].  She had nifedipine added to labetalol for blood pressure management.  She had severe edema that has been gradually improving.  She denies any problems with worsening shortness of breath, no orthopnea.  No chest pain.  Blood pressures are currently 130s over 80s on labetalol 200 mg twice a day and nifedipine 60 mg daily.  Significant family history of heart disease.  No history of smoking.  No alcohol intake.  Twelve-lead EKG on 12/3/2024 shows sinus rhythm at 93 bpm otherwise normal.       Physical Examination  Review of Systems   Vitals: /72 (BP  "Location: Right arm, Patient Position: Sitting, Cuff Size: Adult Regular)   Pulse 80   Resp 16   Ht 1.613 m (5' 3.5\")   Wt 104.1 kg (229 lb 6.4 oz)   BMI 40.00 kg/m    BMI= Body mass index is 40 kg/m .  Wt Readings from Last 3 Encounters:   25 104.1 kg (229 lb 6.4 oz)   25 111.5 kg (245 lb 12.8 oz)   01/15/25 107 kg (236 lb)       General Appearance:   no distress, normal body habitus   ENT/Mouth: membranes moist, no oral lesions or bleeding gums.      EYES:  no scleral icterus, normal conjunctivae   Neck: no carotid bruits or thyromegaly   Chest/Lungs:   lungs are clear to auscultation   Cardiovascular:   Regular. Normal first and second heart sounds with no murmur edema bilaterally    Abdomen:  no organomegaly, masses, bruits, or tenderness; bowel sounds are present   Extremities: no cyanosis or clubbing   Skin: no xanthelasma, warm.    Neurologic: normal  bilateral, no tremors     Psychiatric: alert and oriented x3, calm        Please refer above for cardiac ROS details.        Medical History  Surgical History Family History Social History   Past Medical History:   Diagnosis Date     Hypertension      Uterine polyp      Past Surgical History:   Procedure Laterality Date     APPENDECTOMY  2020      SECTION N/A 2025    Procedure:  SECTION;  Surgeon: Melina Roque MD;  Location: [facility]     DILATION AND CURETTAGE, OPERATIVE HYSTEROSCOPY WITH MORCELLATOR, COMBINED N/A 2024    Procedure: HYSTEROSCOPY DILATION AND CURETTAGE WITH MYOSURE;  Surgeon: Nieves Smith MD;  Location: [facility]     LAPAROSCOPY  2021     LAPAROSCOPY  2020     No family history on file.     Social History     Socioeconomic History     Marital status:      Spouse name: Not on file     Number of children: Not on file     Years of education: Not on file     Highest education level: Not on file   Occupational History     Not on file   Tobacco " Use     Smoking status: Never     Smokeless tobacco: Never   Substance and Sexual Activity     Alcohol use: Not Currently     Comment: occ     Drug use: Never     Sexual activity: Not on file   Other Topics Concern     Not on file   Social History Narrative     Not on file     Social Drivers of Health     Financial Resource Strain: Low Risk  (2/12/2025)    Financial Resource Strain      Within the past 12 months, have you or your family members you live with been unable to get utilities (heat, electricity) when it was really needed?: No   Food Insecurity: Low Risk  (2/12/2025)    Food Insecurity      Within the past 12 months, did you worry that your food would run out before you got money to buy more?: No      Within the past 12 months, did the food you bought just not last and you didn t have money to get more?: No   Transportation Needs: Low Risk  (2/12/2025)    Transportation Needs      Within the past 12 months, has lack of transportation kept you from medical appointments, getting your medicines, non-medical meetings or appointments, work, or from getting things that you need?: No   Physical Activity: Not on file   Stress: Not on file   Social Connections: Not on file   Interpersonal Safety: Low Risk  (2/12/2025)    Interpersonal Safety      Do you feel physically and emotionally safe where you currently live?: Yes      Within the past 12 months, have you been hit, slapped, kicked or otherwise physically hurt by someone?: No      Within the past 12 months, have you been humiliated or emotionally abused in other ways by your partner or ex-partner?: No   Housing Stability: Low Risk  (2/12/2025)    Housing Stability      Do you have housing? : Yes      Are you worried about losing your housing?: No           Medications  Allergies   Current Outpatient Medications   Medication Sig Dispense Refill     labetalol (NORMODYNE) 300 MG tablet Take 2 tablets (600 mg) by mouth every 8 hours. (Patient taking differently:  "Take 200 mg by mouth 2 times daily.) 60 tablet 0     NIFEdipine ER (ADALAT CC) 60 MG 24 hr tablet Take 1 tablet (60 mg) by mouth daily. 60 tablet 0     sertraline (ZOLOFT) 50 MG tablet Take 50 mg by mouth daily.       acetaminophen (TYLENOL) 325 MG tablet Take 3 tablets (975 mg) by mouth every 6 hours. (Patient not taking: Reported on 3/18/2025) 60 tablet 1     ibuprofen (ADVIL/MOTRIN) 800 MG tablet Take 1 tablet (800 mg) by mouth every 6 hours. (Patient not taking: Reported on 3/18/2025) 60 tablet 0     oxyCODONE (ROXICODONE) 5 MG tablet Take 1 tablet (5 mg) by mouth every 4 hours as needed for moderate to severe pain. (Patient not taking: Reported on 3/18/2025) 10 tablet 0     No Known Allergies       Lab Results    Chemistry/lipid CBC Cardiac Enzymes/BNP/TSH/INR   No results for input(s): \"CHOL\", \"HDL\", \"LDL\", \"TRIG\", \"CHOLHDLRATIO\" in the last 44539 hours.  No results for input(s): \"LDL\" in the last 05252 hours.  Recent Labs   Lab Test 02/16/25  1842      POTASSIUM 4.1   CHLORIDE 104   CO2 24   GLC 95   BUN 11.8   CR 0.73   GFRESTIMATED >90   VISHNU 9.3     Recent Labs   Lab Test 02/16/25  1842 02/16/25  0542 02/15/25  0643   CR 0.73 0.75 0.72     No results for input(s): \"A1C\" in the last 87864 hours.       Recent Labs   Lab Test 02/17/25  0629 02/16/25  0542   WBC  --  10.7   HGB  --  10.6*   HCT  --  31.9*   MCV  --  94    176     Recent Labs   Lab Test 02/16/25  0542 02/15/25  0643 02/14/25  1459   HGB 10.6* 10.6* 12.8    No results for input(s): \"TROPONINI\" in the last 57526 hours.  Recent Labs   Lab Test 12/03/24  1745   NTBNPI 76     No results for input(s): \"TSH\" in the last 69122 hours.  Recent Labs   Lab Test 02/14/25  1353 02/14/25  0548 02/13/25  2150   INR 0.96 0.89 0.89        Diandra Encarnacion MD                                        Thank you for allowing me to participate in the care of your patient.      Sincerely,     Diandra Encarnacion MD     [facility] " [facility]  cc:   No referring provider defined for this encounter.

## 2025-03-18 NOTE — PROGRESS NOTES
"  HEART CARE ENCOUNTER CONSULTATON NOTE      [facility]  [phone]      Assessment/Recommendations   Assessment:  1.  Hypertension: Pregnancy complicated by gestational hypertension and severe preeclampsia.  She was initially quite volume overloaded and it has gradually improved but she still has significant edema on exam, mainly pedal.  Blood pressure is currently well-controlled on labetalol and nifedipine.  Plan:  1.  Recommend continue on current medications, labetalol 200 mg twice daily and nifedipine XL 60 mg daily  2.  Recommend good hydration, low-sodium intake and regular exercise  3.  Echocardiogram and will check a BNP today         History of Present Illness/Subjective    HPI: Sarah Parada is a 35 year old female with history of recent pregnancy complicated by preeclampsia and gestational hypertension who I am seeing today for initial consultation.  She had gestational hypertension and was managed with labetalol.  She was admitted on 2025 at 30 weeks gestational age with elevated blood pressures and found to have severe preeclampsia and eventually underwent .  Her son is still in the NICU at [facility].  She had nifedipine added to labetalol for blood pressure management.  She had severe edema that has been gradually improving.  She denies any problems with worsening shortness of breath, no orthopnea.  No chest pain.  Blood pressures are currently 130s over 80s on labetalol 200 mg twice a day and nifedipine 60 mg daily.  Significant family history of heart disease.  No history of smoking.  No alcohol intake.  Twelve-lead EKG on 12/3/2024 shows sinus rhythm at 93 bpm otherwise normal.       Physical Examination  Review of Systems   Vitals: /72 (BP Location: Right arm, Patient Position: Sitting, Cuff Size: Adult Regular)   Pulse 80   Resp 16   Ht 1.613 m (5' 3.5\")   Wt 104.1 kg (229 lb 6.4 oz)   BMI 40.00 kg/m    BMI= Body mass index is 40 " kg/m .  Wt Readings from Last 3 Encounters:   25 104.1 kg (229 lb 6.4 oz)   25 111.5 kg (245 lb 12.8 oz)   01/15/25 107 kg (236 lb)       General Appearance:   no distress, normal body habitus   ENT/Mouth: membranes moist, no oral lesions or bleeding gums.      EYES:  no scleral icterus, normal conjunctivae   Neck: no carotid bruits or thyromegaly   Chest/Lungs:   lungs are clear to auscultation   Cardiovascular:   Regular. Normal first and second heart sounds with no murmur edema bilaterally    Abdomen:  no organomegaly, masses, bruits, or tenderness; bowel sounds are present   Extremities: no cyanosis or clubbing   Skin: no xanthelasma, warm.    Neurologic: normal  bilateral, no tremors     Psychiatric: alert and oriented x3, calm        Please refer above for cardiac ROS details.        Medical History  Surgical History Family History Social History   Past Medical History:   Diagnosis Date    Hypertension     Uterine polyp      Past Surgical History:   Procedure Laterality Date    APPENDECTOMY  2020     SECTION N/A 2025    Procedure:  SECTION;  Surgeon: Melina Roque MD;  Location: [facility]    DILATION AND CURETTAGE, OPERATIVE HYSTEROSCOPY WITH MORCELLATOR, COMBINED N/A 2024    Procedure: HYSTEROSCOPY DILATION AND CURETTAGE WITH MYOSURE;  Surgeon: Nieves Smith MD;  Location: [facility]    LAPAROSCOPY  2021    LAPAROSCOPY  2020     No family history on file.     Social History     Socioeconomic History    Marital status:      Spouse name: Not on file    Number of children: Not on file    Years of education: Not on file    Highest education level: Not on file   Occupational History    Not on file   Tobacco Use    Smoking status: Never    Smokeless tobacco: Never   Substance and Sexual Activity    Alcohol use: Not Currently     Comment: occ    Drug use: Never    Sexual activity: Not on file   Other Topics Concern     Not on file   Social History Narrative    Not on file     Social Drivers of Health     Financial Resource Strain: Low Risk  (2/12/2025)    Financial Resource Strain     Within the past 12 months, have you or your family members you live with been unable to get utilities (heat, electricity) when it was really needed?: No   Food Insecurity: Low Risk  (2/12/2025)    Food Insecurity     Within the past 12 months, did you worry that your food would run out before you got money to buy more?: No     Within the past 12 months, did the food you bought just not last and you didn t have money to get more?: No   Transportation Needs: Low Risk  (2/12/2025)    Transportation Needs     Within the past 12 months, has lack of transportation kept you from medical appointments, getting your medicines, non-medical meetings or appointments, work, or from getting things that you need?: No   Physical Activity: Not on file   Stress: Not on file   Social Connections: Not on file   Interpersonal Safety: Low Risk  (2/12/2025)    Interpersonal Safety     Do you feel physically and emotionally safe where you currently live?: Yes     Within the past 12 months, have you been hit, slapped, kicked or otherwise physically hurt by someone?: No     Within the past 12 months, have you been humiliated or emotionally abused in other ways by your partner or ex-partner?: No   Housing Stability: Low Risk  (2/12/2025)    Housing Stability     Do you have housing? : Yes     Are you worried about losing your housing?: No           Medications  Allergies   Current Outpatient Medications   Medication Sig Dispense Refill    labetalol (NORMODYNE) 300 MG tablet Take 2 tablets (600 mg) by mouth every 8 hours. (Patient taking differently: Take 200 mg by mouth 2 times daily.) 60 tablet 0    NIFEdipine ER (ADALAT CC) 60 MG 24 hr tablet Take 1 tablet (60 mg) by mouth daily. 60 tablet 0    sertraline (ZOLOFT) 50 MG tablet Take 50 mg by mouth daily.      acetaminophen  "(TYLENOL) 325 MG tablet Take 3 tablets (975 mg) by mouth every 6 hours. (Patient not taking: Reported on 3/18/2025) 60 tablet 1    ibuprofen (ADVIL/MOTRIN) 800 MG tablet Take 1 tablet (800 mg) by mouth every 6 hours. (Patient not taking: Reported on 3/18/2025) 60 tablet 0    oxyCODONE (ROXICODONE) 5 MG tablet Take 1 tablet (5 mg) by mouth every 4 hours as needed for moderate to severe pain. (Patient not taking: Reported on 3/18/2025) 10 tablet 0     No Known Allergies       Lab Results    Chemistry/lipid CBC Cardiac Enzymes/BNP/TSH/INR   No results for input(s): \"CHOL\", \"HDL\", \"LDL\", \"TRIG\", \"CHOLHDLRATIO\" in the last 89632 hours.  No results for input(s): \"LDL\" in the last 29654 hours.  Recent Labs   Lab Test 02/16/25  1842      POTASSIUM 4.1   CHLORIDE 104   CO2 24   GLC 95   BUN 11.8   CR 0.73   GFRESTIMATED >90   VISHNU 9.3     Recent Labs   Lab Test 02/16/25  1842 02/16/25  0542 02/15/25  0643   CR 0.73 0.75 0.72     No results for input(s): \"A1C\" in the last 50498 hours.       Recent Labs   Lab Test 02/17/25  0629 02/16/25  0542   WBC  --  10.7   HGB  --  10.6*   HCT  --  31.9*   MCV  --  94    176     Recent Labs   Lab Test 02/16/25  0542 02/15/25  0643 02/14/25  1459   HGB 10.6* 10.6* 12.8    No results for input(s): \"TROPONINI\" in the last 56585 hours.  Recent Labs   Lab Test 12/03/24  1745   NTBNPI 76     No results for input(s): \"TSH\" in the last 54516 hours.  Recent Labs   Lab Test 02/14/25  1353 02/14/25  0548 02/13/25  2150   INR 0.96 0.89 0.89        Diandra Encarnacion MD                                      "

## 2025-03-19 ENCOUNTER — LACTATION ENCOUNTER (OUTPATIENT)
Dept: OTHER | Facility: HOSPITAL | Age: 35
End: 2025-03-19

## 2025-03-19 NOTE — LACTATION NOTE
This note was copied from a baby's chart.  Follow up NICU Lactation Visit    Gestational Age at Delivery:  30w2d     Corrected Gestational Age:  35w0d    Current Age:  4 weeks old    Method of Feedings:   NG, Bottle, and Breast    Hand Hugs/STS/Nuzzling/Latching/ Weighted feeds:  nuzzling - has not tried in a few days    Visit Summary:  Bedside Rn and Sarah report difficulty with reflux symptoms. Infant is taking very small PO volumes 2-4ml with bottle. Sarah expresses worry it's too much to work on breast and bottle. Offered reassurance and encouragement to work on method of feeding she chooses. Reviewed LC team is here to help optimize positioning especially with reflux. No need to pump prior to nuzzling/latching.     She ordered a galactogue, but has not received it in the mail yet/started taking it. Noticing slowing increase milk volumes.     Pumping Volume p/24hours:  8x/day 100ml x7 and larger volumes in am.     Pump for home use: Symphony     Adjustments to Plan:  Support with latching/positioning. Stop pumping prior to nuzzling/latch attempts.     Education:  [] First drops kit  [] Benefits of breast milk  [] How breast milk is made  [] Stages of milk production  [x] Milk supply/goal volumes  [] Maternal risk factors that could affect milk supply   [] Hand expression  [] Collecting, labeling, transporting milk  [] Cleaning, sanitizing pump parts  [] Storage of milk  [] Importance of pumping minimum of 8x in 24 hours   [] Hands on Pumping   [] Hospital grade pump use and care  [] Initiate setting   [] Maintain setting  [] How to rent a hospital grade breast pump  [] Engorgement  [] Increasing milk supply  [] Mastitis/Ductal narrowing  [] Weighted feeding  [] Nipple shield  [x] Latch and positioning   [] Signs of milk transfer  [x] Nuzzling  [x] Review how to access lactation consultant prn   [] Outpatient Lactation  [] Feeding Plan for home

## 2025-03-26 ENCOUNTER — LACTATION ENCOUNTER (OUTPATIENT)
Dept: OTHER | Facility: HOSPITAL | Age: 35
End: 2025-03-26

## 2025-03-26 NOTE — LACTATION NOTE
This note was copied from a baby's chart.  Follow up NICU Lactation Visit    Gestational Age at Delivery: 30w2d     Corrected Gestational Age: 36w0d    Current Age: 40 do    Method of Feedings: NG and bottle 5% po     Breastfeeding goals: okay with pump and bottle- maybe offer breast once at home or before discharge       Visit Summary: pumping about 100-120ml every 3 hours- longer stretch over night pumping about 150-240ml increasing slightly.        Adjustments to Plan: none at this time

## 2025-04-03 ENCOUNTER — LACTATION ENCOUNTER (OUTPATIENT)
Dept: OTHER | Facility: HOSPITAL | Age: 35
End: 2025-04-03

## 2025-04-03 NOTE — LACTATION NOTE
This note was copied from a baby's chart.  Follow up NICU Lactation Visit    Gestational Age at Delivery: 30w2d     Corrected Gestational Age: 37w1d    Current Age: 48 do     Method of Feedings: NG 15% PO       breastfeeding goals: okay with pump and bottle- maybe offer breast once at home or before discharge       Visit Summary: Checked in with mom- mom reported that she started her cycle, she is aware that her supply may dip during her period but should return after her cycle is finished.    Made plan with mom for LC to check in with her in 7 days or sooner if she requests.

## 2025-04-09 ENCOUNTER — LACTATION ENCOUNTER (OUTPATIENT)
Dept: OTHER | Facility: HOSPITAL | Age: 35
End: 2025-04-09

## 2025-04-09 NOTE — LACTATION NOTE
This note was copied from a baby's chart.  Follow up NICU Lactation Visit    Gestational Age at Delivery:  30w2d     Corrected Gestational Age:  38w0d    Current Age:  7 week old    Method of Feedings:   NG and Bottle    Breastfeeding goals:  Just bottle at this time. Reported maybe wanting to work on latching with OT. Sarah still reports to LC wanting to focus on bottles. Support provided and encouraged that occasional latching if desired is all supporting Alverto's learning to feed.     Feeding Assessment:  NA    Visit Summary:  Sarah has been taking Amoxicillin for a sinus infection for the last 4 days. She has been pumping and dumping despite RN and neonatology telling her not to. Provided with information from [facility]. Amoxicillin in a L1 and reviewed it is safe to give her milk. She plans to stop dumping the milk. Encouraged to reach out to lactation/pharmacy/provider for them to look up any new medication in [facility] so she can have information and make choices based on that.     She reports she just had some spotting and not her full cycle return. Did provide handout from [employer] [facility] [employer]feeding and cycle returning and possible use of Magnesium/Calcium if supply dip during cycle. Encouraged talking to provider before starting any supplements or medications to okay these.     Pumping Volume p/24hours:  25pz/day, no changes    Pump for home use: Symphony     Adjustments to Plan:  Stop pump and dumping    Education:  [] First drops kit  [] Benefits of breast milk  [] How breast milk is made  [] Stages of milk production  [] Milk supply/goal volumes  [] Maternal risk factors that could affect milk supply   [] Hand expression  [] Collecting, labeling, transporting milk  [] Cleaning, sanitizing pump parts  [] Storage of milk  [] Importance of pumping minimum of 8x in 24 hours   [] Hands on Pumping   [] Hospital grade pump use and care  [] Initiate setting   [] Maintain setting  [] How to rent a hospital  grade breast pump  [] Engorgement  [] Increasing milk supply  [] Mastitis/Ductal narrowing  [] Weighted feeding  [] Nipple shield  [] Latch and positioning   [] Signs of milk transfer  [] Nuzzling  [x] Review how to access lactation consultant prn   [] Outpatient Lactation  [] Feeding Plan for home

## 2025-04-18 ENCOUNTER — HOSPITAL ENCOUNTER (OUTPATIENT)
Dept: CARDIOLOGY | Facility: HOSPITAL | Age: 35
Discharge: HOME | End: 2025-04-18
Attending: INTERNAL MEDICINE | Admitting: INTERNAL MEDICINE
Payer: COMMERCIAL

## 2025-04-18 PROCEDURE — 93306 TTE W/DOPPLER COMPLETE: CPT | Mod: 26 | Performed by: INTERNAL MEDICINE

## 2025-04-18 PROCEDURE — 93306 TTE W/DOPPLER COMPLETE: CPT

## 2025-04-20 ENCOUNTER — LACTATION ENCOUNTER (OUTPATIENT)
Dept: OTHER | Facility: HOSPITAL | Age: 35
End: 2025-04-20

## 2025-04-20 NOTE — LACTATION NOTE
This note was copied from a baby's chart.  Follow up NICU Lactation Visit    Gestational Age at Delivery:  30w2d     Corrected Gestational Age:  39w4d    Current Age:  65 do    Method of Feedings:   NG and Bottle, PO 18%    Breastfeeding goals: Pump and bottle at this time. Alverto has a swallow study pending this week.     Visit Summary:  Check in.  Sarah reported continued success with her pumping with the symphony at home and in the NICU.  She is getting rest at night by spreading out the sessions and maintaining her supply.   Sarah has not started back with her menstrual cycles yet.  She has occasional spotting.    Pumping Volume p/24hours:  25 oz/ 24 hours    Pump for home use: Symphony     Adjustments to Plan:  No change at this time.     Education:  [] First drops kit  [] Benefits of breast milk  [] How breast milk is made  [] Stages of milk production  [x] Milk supply/goal volumes  [] Maternal risk factors that could affect milk supply   [] Hand expression  [] Collecting, labeling, transporting milk  [] Cleaning, sanitizing pump parts  [] Storage of milk  [] Importance of pumping minimum of 8x in 24 hours   [] Hands on Pumping   [x] Hospital grade pump use and care/moisture in tubing  [] Initiate setting   [] Maintain setting  [] How to rent a hospital grade breast pump  [] Engorgement  [] Increasing milk supply  [] Mastitis/Ductal narrowing  [] Weighted feeding  [] Nipple shield  [] Latch and positioning   [] Signs of milk transfer  [] Nuzzling  [x] Review how to access lactation consultant prn   [] Outpatient Lactation  [] Feeding Plan for home

## 2025-05-05 ENCOUNTER — MEDICAL CORRESPONDENCE (OUTPATIENT)
Dept: HEALTH INFORMATION MANAGEMENT | Facility: CLINIC | Age: 35
End: 2025-05-05
Payer: COMMERCIAL

## 2025-06-08 ENCOUNTER — HEALTH MAINTENANCE LETTER (OUTPATIENT)
Age: 35
End: 2025-06-08

## 2025-07-21 ENCOUNTER — VIRTUAL VISIT (OUTPATIENT)
Dept: FAMILY MEDICINE | Facility: CLINIC | Age: 35
End: 2025-07-21
Payer: COMMERCIAL

## 2025-07-21 DIAGNOSIS — Z20.822 PERSON UNDER INVESTIGATION FOR COVID-19: Primary | ICD-10-CM

## 2025-07-21 PROCEDURE — 98005 SYNCH AUDIO-VIDEO EST LOW 20: CPT | Performed by: FAMILY MEDICINE

## 2025-07-21 NOTE — PROGRESS NOTES
Sarah is a 35 year old who is being evaluated via a billable video visit.    How would you like to obtain your AVS? MyChart  If the video visit is dropped, the invitation should be resent by: Text to cell phone: [phone]  Will anyone else be joining your video visit? No    Assessment & Plan   1. Person under investigation for COVID-19 (Primary)  Given positive home test, likely COVID. Paxlovid ordered. PCR ordered per patient request. Scheduled tomorrow at 11 am in [place] (closest location to the patient).  - COVID-19 Virus (Coronavirus) by PCR  - nirmatrelvir and ritonavir (PAXLOVID) 300 mg/100 mg therapy pack; Take 3 tablets by mouth 2 times daily for 5 days. (Take 2 Nirmatrelvir tablets and 1 Ritonavir tablet twice daily for 5 days)  Dispense: 30 tablet; Refill: 0    Kvng Hernandez MD  [facility]    Disclaimer: This note consists of symbols derived from keyboarding, dictation and/or voice recognition software. As a result, there may be errors in the script that have gone undetected. Please consider this when interpreting information found in this chart.    The longitudinal plan of care for the diagnosis(es)/condition(s) as documented were addressed during this visit. Due to the added complexity in care, I will continue to support Sarah in the subsequent management and with ongoing continuity of care.    Subjective   Sarah is a 35 year old, presenting for the following health issues:  Covid Concern        7/21/2025     4:18 PM   Additional Questions   Roomed by Shyann DURAN   Accompanied by None         7/21/2025     4:18 PM   Patient Reported Additional Medications   Patient reports taking the following new medications NA     Reports at home test today. Pt is concerned about 5 month old's health with Covid.     Via the Health Maintenance questionnaire, the patient has reported the following services have been completed -Cervical Cancer Screening: [facility]  2024-10-10, this information has been sent to the abstraction team.    URI    History of Present Illness       Reason for visit:  Covid  Symptom intensity:  Mild         COVID-19 Symptom Review  How many days ago did these symptoms start? 2 days ago     Are any of the following symptoms significant for you?  New or worsening difficulty breathing? No  Worsening cough? No  Fever or chills? No  Headache: YES  Sore throat: YES  Chest pain: No  Diarrhea: YES  Body aches? YES    What treatments has patient tried? Zinc, vitamin D and C, Ibuprofen    Does patient live in a nursing home, group home, or shelter? No  Does patient have a way to get food/medications during quarantined? Yes, I have a friend or family member who can help me.    Patient completed a home test that was positive. Symptoms started around Saturday with exposure this last Thursday. Would like a PCR test prior to starting paxlovid.    Review of Systems  Constitutional, HEENT, cardiovascular, pulmonary, GI, , musculoskeletal, neuro, skin, endocrine and psych systems are negative, except as otherwise noted.      Objective       Vitals:  No vitals were obtained today due to virtual visit.    Physical Exam   GENERAL: alert and no distress  EYES: Eyes grossly normal to inspection.  No discharge or erythema, or obvious scleral/conjunctival abnormalities.  RESP: No audible wheeze, cough, or visible cyanosis.    SKIN: Visible skin clear. No significant rash, abnormal pigmentation or lesions.  NEURO: Cranial nerves grossly intact.  Mentation and speech appropriate for age.  PSYCH: Appropriate affect, tone, and pace of words    Labs: Pending      Video-Visit Details    Type of service:  Video Visit   Originating Location (pt. Location): Home    Distant Location (provider location):  On-site  Platform used for Video Visit: Adam  Signed Electronically by: Kvng Hernandez MD

## 2025-07-22 ENCOUNTER — APPOINTMENT (OUTPATIENT)
Dept: LAB | Facility: CLINIC | Age: 35
End: 2025-07-22
Payer: COMMERCIAL

## 2025-07-22 LAB — SARS-COV-2 RNA RESP QL NAA+PROBE: NEGATIVE

## 2025-07-22 PROCEDURE — 87635 SARS-COV-2 COVID-19 AMP PRB: CPT | Performed by: FAMILY MEDICINE

## 2025-08-11 ENCOUNTER — MEDICAL CORRESPONDENCE (OUTPATIENT)
Dept: HEALTH INFORMATION MANAGEMENT | Facility: CLINIC | Age: 35
End: 2025-08-11
Payer: COMMERCIAL

## 2025-08-29 ENCOUNTER — ANCILLARY PROCEDURE (OUTPATIENT)
Dept: MAMMOGRAPHY | Facility: CLINIC | Age: 35
End: 2025-08-29
Attending: OBSTETRICS & GYNECOLOGY
Payer: COMMERCIAL

## 2025-08-29 DIAGNOSIS — N63.20 MASS OF LEFT BREAST ON MAMMOGRAM: ICD-10-CM

## 2025-08-29 PROCEDURE — 76642 ULTRASOUND BREAST LIMITED: CPT | Mod: LT

## (undated) DEVICE — DEVICE TISSUE REMOVAL HYSTEROSCOPIC MYOSURE LITE 30-401LITE

## (undated) DEVICE — SOL WATER IRRIG 1000ML BOTTLE 2F7114

## (undated) DEVICE — SU VICRYL 0 CT-1 36" J946H

## (undated) DEVICE — LINEN TOWEL PACK X5 5464

## (undated) DEVICE — ESU GROUND PAD ADULT REM W/15' CORD E7507DB

## (undated) DEVICE — SOL NACL 0.9% INJ 1000ML BAG 2B1324X

## (undated) DEVICE — SUTURE MONOCRYL+ 4-0 PS-2 27IN MCP426H

## (undated) DEVICE — SU VICRYL+ 2-0 XLH 27IN VIO VCP581G

## (undated) DEVICE — SEAL SET MYOSURE ROD LENS SCOPE SINGLE USE 40-902

## (undated) DEVICE — DRSG PRIMAPORE 04X11 3/4"

## (undated) DEVICE — SOL NACL 0.9% IRRIG 1000ML BOTTLE 2F7124

## (undated) DEVICE — UNDERPAD 30X30 BULK 949B10

## (undated) DEVICE — GLOVE PI ULTRATCH M LF SZ 6.5 PF CUFF TEXT STRL LF 42665

## (undated) DEVICE — SUCTION MANIFOLD NEPTUNE 2 SYS 1 PORT 702-025-000

## (undated) DEVICE — SU MONOCRYL 4-0 PS-2 27" UND Y426H

## (undated) DEVICE — CUSTOM PACK C-SECTION LHE

## (undated) DEVICE — DECANTER BAG 2002S

## (undated) DEVICE — BAG BIOHAZARD RED SMALL 24X23" A4823PR

## (undated) DEVICE — SUTURE VICRYL+ 0 36IN CT-1 UND VCP946H

## (undated) DEVICE — SURGICEL POWDER ABSORBABLE HEMOSTAT 3GM 3013SP

## (undated) DEVICE — PACK TVT HYSTEROSCOPY SMA15HYFSE

## (undated) DEVICE — PACK MINOR SINGLE BASIN SSK3001

## (undated) DEVICE — PAD BLADDER CNTRL SM BL 4IN X 9.75IN  1100B

## (undated) DEVICE — GOWN IMPERVIOUS BREATHABLE SMART LG 89015

## (undated) DEVICE — GOWN XXLG REINFORCED 9071EL

## (undated) DEVICE — SOL NACL 0.9% IRRIG 3000ML BAG 2B7477

## (undated) DEVICE — PREP CHLORAPREP 26ML TINTED HI-LITE ORANGE 930815

## (undated) RX ORDER — DEXAMETHASONE SODIUM PHOSPHATE 4 MG/ML
INJECTION, SOLUTION INTRA-ARTICULAR; INTRALESIONAL; INTRAMUSCULAR; INTRAVENOUS; SOFT TISSUE
Status: DISPENSED
Start: 2024-04-18

## (undated) RX ORDER — TRANEXAMIC ACID 100 MG/ML
INJECTION, SOLUTION INTRAVENOUS
Status: DISPENSED
Start: 2025-02-14

## (undated) RX ORDER — ONDANSETRON 2 MG/ML
INJECTION INTRAMUSCULAR; INTRAVENOUS
Status: DISPENSED
Start: 2024-04-18

## (undated) RX ORDER — FENTANYL CITRATE 50 UG/ML
INJECTION, SOLUTION INTRAMUSCULAR; INTRAVENOUS
Status: DISPENSED
Start: 2024-04-18

## (undated) RX ORDER — ACETAMINOPHEN 325 MG/1
TABLET ORAL
Status: DISPENSED
Start: 2024-04-18

## (undated) RX ORDER — FENTANYL CITRATE 50 UG/ML
INJECTION, SOLUTION INTRAMUSCULAR; INTRAVENOUS
Status: DISPENSED
Start: 2025-02-14

## (undated) RX ORDER — PROPOFOL 10 MG/ML
INJECTION, EMULSION INTRAVENOUS
Status: DISPENSED
Start: 2024-04-18

## (undated) RX ORDER — GLYCOPYRROLATE 0.2 MG/ML
INJECTION, SOLUTION INTRAMUSCULAR; INTRAVENOUS
Status: DISPENSED
Start: 2024-04-18

## (undated) RX ORDER — MORPHINE SULFATE 1 MG/ML
INJECTION, SOLUTION EPIDURAL; INTRATHECAL; INTRAVENOUS
Status: DISPENSED
Start: 2025-02-14